# Patient Record
Sex: MALE | Race: BLACK OR AFRICAN AMERICAN | NOT HISPANIC OR LATINO
[De-identification: names, ages, dates, MRNs, and addresses within clinical notes are randomized per-mention and may not be internally consistent; named-entity substitution may affect disease eponyms.]

---

## 2019-03-17 ENCOUNTER — TRANSCRIPTION ENCOUNTER (OUTPATIENT)
Age: 59
End: 2019-03-17

## 2020-03-31 ENCOUNTER — TRANSCRIPTION ENCOUNTER (OUTPATIENT)
Age: 60
End: 2020-03-31

## 2020-04-08 ENCOUNTER — TRANSCRIPTION ENCOUNTER (OUTPATIENT)
Age: 60
End: 2020-04-08

## 2020-07-31 ENCOUNTER — TRANSCRIPTION ENCOUNTER (OUTPATIENT)
Age: 60
End: 2020-07-31

## 2020-08-04 PROBLEM — Z00.00 ENCOUNTER FOR PREVENTIVE HEALTH EXAMINATION: Status: ACTIVE | Noted: 2020-08-04

## 2020-08-05 ENCOUNTER — APPOINTMENT (OUTPATIENT)
Dept: ORTHOPEDIC SURGERY | Facility: CLINIC | Age: 60
End: 2020-08-05
Payer: MEDICAID

## 2020-08-05 VITALS
WEIGHT: 204 LBS | DIASTOLIC BLOOD PRESSURE: 89 MMHG | HEIGHT: 69.5 IN | HEART RATE: 74 BPM | SYSTOLIC BLOOD PRESSURE: 134 MMHG | BODY MASS INDEX: 29.53 KG/M2

## 2020-08-05 VITALS — TEMPERATURE: 97.8 F

## 2020-08-05 DIAGNOSIS — M43.16 SPONDYLOLISTHESIS, LUMBAR REGION: ICD-10-CM

## 2020-08-05 PROCEDURE — 99204 OFFICE O/P NEW MOD 45 MIN: CPT

## 2020-08-05 PROCEDURE — 72100 X-RAY EXAM L-S SPINE 2/3 VWS: CPT

## 2020-11-05 ENCOUNTER — RX RENEWAL (OUTPATIENT)
Age: 60
End: 2020-11-05

## 2022-08-23 ENCOUNTER — NON-APPOINTMENT (OUTPATIENT)
Age: 62
End: 2022-08-23

## 2022-08-31 ENCOUNTER — NON-APPOINTMENT (OUTPATIENT)
Age: 62
End: 2022-08-31

## 2022-08-31 ENCOUNTER — APPOINTMENT (OUTPATIENT)
Dept: FAMILY MEDICINE | Facility: CLINIC | Age: 62
End: 2022-08-31

## 2022-08-31 VITALS
TEMPERATURE: 98.1 F | HEIGHT: 68.5 IN | SYSTOLIC BLOOD PRESSURE: 140 MMHG | RESPIRATION RATE: 18 BRPM | OXYGEN SATURATION: 99 % | HEART RATE: 80 BPM | DIASTOLIC BLOOD PRESSURE: 90 MMHG | BODY MASS INDEX: 29.66 KG/M2 | WEIGHT: 198 LBS

## 2022-08-31 DIAGNOSIS — Z80.0 FAMILY HISTORY OF MALIGNANT NEOPLASM OF DIGESTIVE ORGANS: ICD-10-CM

## 2022-08-31 DIAGNOSIS — Z91.89 OTHER SPECIFIED PERSONAL RISK FACTORS, NOT ELSEWHERE CLASSIFIED: ICD-10-CM

## 2022-08-31 DIAGNOSIS — M54.16 RADICULOPATHY, LUMBAR REGION: ICD-10-CM

## 2022-08-31 DIAGNOSIS — R81 GLYCOSURIA: ICD-10-CM

## 2022-08-31 DIAGNOSIS — Z82.49 FAMILY HISTORY OF ISCHEMIC HEART DISEASE AND OTHER DISEASES OF THE CIRCULATORY SYSTEM: ICD-10-CM

## 2022-08-31 DIAGNOSIS — Z83.3 FAMILY HISTORY OF DIABETES MELLITUS: ICD-10-CM

## 2022-08-31 DIAGNOSIS — Z82.69 FAMILY HISTORY OF OTHER DISEASES OF THE MUSCULOSKELETAL SYSTEM AND CONNECTIVE TISSUE: ICD-10-CM

## 2022-08-31 DIAGNOSIS — R73.9 HYPERGLYCEMIA, UNSPECIFIED: ICD-10-CM

## 2022-08-31 DIAGNOSIS — R79.89 OTHER SPECIFIED ABNORMAL FINDINGS OF BLOOD CHEMISTRY: ICD-10-CM

## 2022-08-31 DIAGNOSIS — Z78.9 OTHER SPECIFIED HEALTH STATUS: ICD-10-CM

## 2022-08-31 PROCEDURE — 93000 ELECTROCARDIOGRAM COMPLETE: CPT | Mod: 59

## 2022-08-31 PROCEDURE — 36415 COLL VENOUS BLD VENIPUNCTURE: CPT

## 2022-08-31 PROCEDURE — G0444 DEPRESSION SCREEN ANNUAL: CPT

## 2022-08-31 PROCEDURE — 99386 PREV VISIT NEW AGE 40-64: CPT | Mod: 25

## 2022-08-31 PROCEDURE — 99203 OFFICE O/P NEW LOW 30 MIN: CPT | Mod: 25

## 2022-08-31 RX ORDER — TESTOSTERONE 10 MG/.5G
10 MG/ACT GEL, METERED TOPICAL
Refills: 0 | Status: ACTIVE | COMMUNITY

## 2022-08-31 RX ORDER — METHYLPREDNISOLONE 4 MG/1
4 TABLET ORAL
Refills: 0 | Status: COMPLETED | COMMUNITY

## 2022-09-01 ENCOUNTER — NON-APPOINTMENT (OUTPATIENT)
Age: 62
End: 2022-09-01

## 2022-09-01 PROBLEM — Z82.49 FAMILY HISTORY OF HYPERTENSION: Status: ACTIVE | Noted: 2022-08-31

## 2022-09-01 PROBLEM — Z82.69 FAMILY HISTORY OF SCLERODERMA: Status: ACTIVE | Noted: 2022-08-31

## 2022-09-01 PROBLEM — M54.16 LUMBAR RADICULOPATHY: Status: ACTIVE | Noted: 2020-08-05

## 2022-09-01 PROBLEM — Z80.0 FAMILY HISTORY OF PANCREATIC CANCER: Status: ACTIVE | Noted: 2022-08-31

## 2022-09-01 PROBLEM — Z91.89 AT INCREASED RISK FOR CARDIOVASCULAR DISEASE: Status: ACTIVE | Noted: 2022-09-01

## 2022-09-01 PROBLEM — R79.89 LOW TESTOSTERONE: Status: RESOLVED | Noted: 2022-08-31 | Resolved: 2022-09-01

## 2022-09-01 LAB
ALBUMIN SERPL ELPH-MCNC: 4.7 G/DL
ALP BLD-CCNC: 51 U/L
ALT SERPL-CCNC: 49 U/L
ANION GAP SERPL CALC-SCNC: 17 MMOL/L
APPEARANCE: CLEAR
AST SERPL-CCNC: 32 U/L
BACTERIA: NEGATIVE
BASOPHILS # BLD AUTO: 0.03 K/UL
BASOPHILS NFR BLD AUTO: 0.5 %
BILIRUB SERPL-MCNC: 0.5 MG/DL
BILIRUBIN URINE: NEGATIVE
BLOOD URINE: NEGATIVE
BUN SERPL-MCNC: 15 MG/DL
CALCIUM SERPL-MCNC: 9.5 MG/DL
CHLORIDE SERPL-SCNC: 101 MMOL/L
CHOLEST SERPL-MCNC: 193 MG/DL
CO2 SERPL-SCNC: 18 MMOL/L
COLOR: YELLOW
CREAT SERPL-MCNC: 1.07 MG/DL
EGFR: 79 ML/MIN/1.73M2
EOSINOPHIL # BLD AUTO: 0.16 K/UL
EOSINOPHIL NFR BLD AUTO: 2.6 %
ESTIMATED AVERAGE GLUCOSE: 249 MG/DL
GLUCOSE QUALITATIVE U: ABNORMAL
GLUCOSE SERPL-MCNC: 294 MG/DL
HBA1C MFR BLD HPLC: 10.3 %
HCT VFR BLD CALC: 51.2 %
HDLC SERPL-MCNC: 50 MG/DL
HGB BLD-MCNC: 17.2 G/DL
HYALINE CASTS: 1 /LPF
IMM GRANULOCYTES NFR BLD AUTO: 0.2 %
KETONES URINE: NORMAL
LDLC SERPL CALC-MCNC: 79 MG/DL
LEUKOCYTE ESTERASE URINE: NEGATIVE
LYMPHOCYTES # BLD AUTO: 1.95 K/UL
LYMPHOCYTES NFR BLD AUTO: 31.7 %
MAN DIFF?: NORMAL
MCHC RBC-ENTMCNC: 29.7 PG
MCHC RBC-ENTMCNC: 33.6 GM/DL
MCV RBC AUTO: 88.3 FL
MICROSCOPIC-UA: NORMAL
MONOCYTES # BLD AUTO: 0.61 K/UL
MONOCYTES NFR BLD AUTO: 9.9 %
NEUTROPHILS # BLD AUTO: 3.4 K/UL
NEUTROPHILS NFR BLD AUTO: 55.1 %
NITRITE URINE: NEGATIVE
NONHDLC SERPL-MCNC: 143 MG/DL
PH URINE: 6
PLATELET # BLD AUTO: 226 K/UL
POTASSIUM SERPL-SCNC: 4.2 MMOL/L
PROT SERPL-MCNC: 7.8 G/DL
PROTEIN URINE: NEGATIVE
RBC # BLD: 5.8 M/UL
RBC # FLD: 13.3 %
RED BLOOD CELLS URINE: 2 /HPF
SODIUM SERPL-SCNC: 136 MMOL/L
SPECIFIC GRAVITY URINE: 1.02
SQUAMOUS EPITHELIAL CELLS: 0 /HPF
TRIGL SERPL-MCNC: 322 MG/DL
TSH SERPL-ACNC: 1.59 UIU/ML
UROBILINOGEN URINE: NORMAL
WBC # FLD AUTO: 6.16 K/UL
WHITE BLOOD CELLS URINE: 0 /HPF

## 2022-09-01 RX ORDER — CYCLOBENZAPRINE HYDROCHLORIDE 10 MG/1
10 TABLET, FILM COATED ORAL
Refills: 0 | Status: COMPLETED | COMMUNITY
End: 2022-09-01

## 2022-11-30 ENCOUNTER — RX RENEWAL (OUTPATIENT)
Age: 62
End: 2022-11-30

## 2022-12-08 ENCOUNTER — RX RENEWAL (OUTPATIENT)
Age: 62
End: 2022-12-08

## 2022-12-13 ENCOUNTER — APPOINTMENT (OUTPATIENT)
Dept: FAMILY MEDICINE | Facility: CLINIC | Age: 62
End: 2022-12-13

## 2022-12-13 VITALS
DIASTOLIC BLOOD PRESSURE: 86 MMHG | TEMPERATURE: 98.8 F | BODY MASS INDEX: 31.91 KG/M2 | RESPIRATION RATE: 16 BRPM | SYSTOLIC BLOOD PRESSURE: 132 MMHG | OXYGEN SATURATION: 97 % | WEIGHT: 213 LBS | HEART RATE: 102 BPM

## 2022-12-13 DIAGNOSIS — Z00.00 ENCOUNTER FOR GENERAL ADULT MEDICAL EXAMINATION W/OUT ABNORMAL FINDINGS: ICD-10-CM

## 2022-12-13 PROCEDURE — G0009: CPT

## 2022-12-13 PROCEDURE — 99214 OFFICE O/P EST MOD 30 MIN: CPT | Mod: 25

## 2022-12-13 PROCEDURE — 90677 PCV20 VACCINE IM: CPT

## 2022-12-13 NOTE — HISTORY OF PRESENT ILLNESS
[FreeTextEntry1] : Follow up for Diabetes and hypertriglyceridemia management [de-identified] : Pt reports doing well, has no acute issues to offer on this visit. Reports adherence to metformin 1000mg BID, glipizide 5mg in the AM, and daily icosapent ethyl, without side effects. He reports decreased nocturia, also has not been drinking too much water prior to bed. Reports eating healthy, reduced alcohol intake.\par Denies any visual disturbances, denies tingling/numbness sensation in extremities.\par \par Pt has completed 8 sessions of physical therapy for known lumbar radiculopathy, and reports overall with improved pain, occasionally gets pain and takes his tizanidine which helps.\par \par He recall getting a pneumococcal vaccination in 2020 following a pneumonia infection. Pt does not recall the type of pneumococcal vaccine received. \par He received flu vaccination and Pfizer COVID booster vaccines this past October at his pharmacy.

## 2022-12-13 NOTE — PHYSICAL EXAM
[Normal] : normal gait, coordination grossly intact, no focal deficits and deep tendon reflexes were 2+ and symmetric [Speech Grossly Normal] : speech grossly normal [Memory Grossly Normal] : memory grossly normal [Normal Affect] : the affect was normal [Alert and Oriented x3] : oriented to person, place, and time [Normal Mood] : the mood was normal [Normal Insight/Judgement] : insight and judgment were intact [None] : no ulcers in either foot were found [] : both feet [de-identified] : onychomycosis of R great toenail; + interdigit scaling lesions especially of R foot [TWNoteComboBox3] : +2 [TWNoteComboBox4] : +2

## 2022-12-13 NOTE — REVIEW OF SYSTEMS
[Negative] : Psychiatric [Joint Pain] : no joint pain [Joint Stiffness] : no joint stiffness [Muscle Pain] : no muscle pain [Muscle Weakness] : no muscle weakness

## 2022-12-16 ENCOUNTER — NON-APPOINTMENT (OUTPATIENT)
Age: 62
End: 2022-12-16

## 2022-12-16 LAB
ANION GAP SERPL CALC-SCNC: 14 MMOL/L
BUN SERPL-MCNC: 15 MG/DL
CALCIUM SERPL-MCNC: 9.7 MG/DL
CHLORIDE SERPL-SCNC: 103 MMOL/L
CHOLEST SERPL-MCNC: 184 MG/DL
CO2 SERPL-SCNC: 23 MMOL/L
CREAT SERPL-MCNC: 1.16 MG/DL
CREAT SPEC-SCNC: 87 MG/DL
EGFR: 71 ML/MIN/1.73M2
ESTIMATED AVERAGE GLUCOSE: 146 MG/DL
GLUCOSE SERPL-MCNC: 141 MG/DL
HBA1C MFR BLD HPLC: 6.7 %
HDLC SERPL-MCNC: 61 MG/DL
LDLC SERPL CALC-MCNC: 97 MG/DL
MICROALBUMIN 24H UR DL<=1MG/L-MCNC: <1.2 MG/DL
MICROALBUMIN/CREAT 24H UR-RTO: NORMAL MG/G
NONHDLC SERPL-MCNC: 123 MG/DL
POTASSIUM SERPL-SCNC: 4.3 MMOL/L
SODIUM SERPL-SCNC: 140 MMOL/L
TRIGL SERPL-MCNC: 133 MG/DL
VIT B12 SERPL-MCNC: 718 PG/ML

## 2023-01-04 ENCOUNTER — RX RENEWAL (OUTPATIENT)
Age: 63
End: 2023-01-04

## 2023-02-02 ENCOUNTER — RX RENEWAL (OUTPATIENT)
Age: 63
End: 2023-02-02

## 2023-03-16 ENCOUNTER — APPOINTMENT (OUTPATIENT)
Dept: FAMILY MEDICINE | Facility: CLINIC | Age: 63
End: 2023-03-16

## 2023-03-27 ENCOUNTER — APPOINTMENT (OUTPATIENT)
Dept: FAMILY MEDICINE | Facility: CLINIC | Age: 63
End: 2023-03-27

## 2023-04-04 ENCOUNTER — RX RENEWAL (OUTPATIENT)
Age: 63
End: 2023-04-04

## 2023-06-14 ENCOUNTER — RX RENEWAL (OUTPATIENT)
Age: 63
End: 2023-06-14

## 2023-06-26 ENCOUNTER — RX RENEWAL (OUTPATIENT)
Age: 63
End: 2023-06-26

## 2023-07-06 ENCOUNTER — APPOINTMENT (OUTPATIENT)
Dept: FAMILY MEDICINE | Facility: CLINIC | Age: 63
End: 2023-07-06
Payer: MEDICAID

## 2023-07-06 VITALS
OXYGEN SATURATION: 97 % | RESPIRATION RATE: 16 BRPM | SYSTOLIC BLOOD PRESSURE: 150 MMHG | DIASTOLIC BLOOD PRESSURE: 98 MMHG | HEIGHT: 68.5 IN | HEART RATE: 92 BPM | BODY MASS INDEX: 32.96 KG/M2 | WEIGHT: 220 LBS

## 2023-07-06 DIAGNOSIS — B35.1 TINEA UNGUIUM: ICD-10-CM

## 2023-07-06 DIAGNOSIS — B35.3 TINEA PEDIS: ICD-10-CM

## 2023-07-06 PROCEDURE — 99214 OFFICE O/P EST MOD 30 MIN: CPT | Mod: 25

## 2023-07-06 RX ORDER — TIZANIDINE 4 MG/1
4 TABLET ORAL
Qty: 30 | Refills: 1 | Status: DISCONTINUED | COMMUNITY
Start: 2020-08-05 | End: 2023-07-06

## 2023-07-06 RX ORDER — CICLOPIROX 80 MG/ML
8 SOLUTION TOPICAL
Qty: 2 | Refills: 3 | Status: DISCONTINUED | COMMUNITY
Start: 2022-12-13 | End: 2023-07-06

## 2023-07-06 RX ORDER — TERBINAFINE HYDROCHLORIDE 1 G/100G
1 CREAM TOPICAL
Qty: 1 | Refills: 1 | Status: DISCONTINUED | COMMUNITY
Start: 2022-12-13 | End: 2023-07-06

## 2023-07-06 NOTE — REVIEW OF SYSTEMS
[Fatigue] : fatigue [Recent Change In Weight] : ~T recent weight change [Negative] : Heme/Lymph [Fever] : no fever [Chills] : no chills [Night Sweats] : no night sweats [FreeTextEntry2] : reports weight gain

## 2023-07-06 NOTE — PHYSICAL EXAM
[Normal Oropharynx] : the oropharynx was normal [No Carotid Bruits] : no carotid bruits [No Varicosities] : no varicosities [Pedal Pulses Present] : the pedal pulses are present [No Edema] : there was no peripheral edema [No Extremity Clubbing/Cyanosis] : no extremity clubbing/cyanosis [Soft] : abdomen soft [Non Tender] : non-tender [Normal] : no joint swelling and grossly normal strength and tone [Coordination Grossly Intact] : coordination grossly intact [No Focal Deficits] : no focal deficits [Normal Gait] : normal gait [Speech Grossly Normal] : speech grossly normal [Memory Grossly Normal] : memory grossly normal [Normal Affect] : the affect was normal [Alert and Oriented x3] : oriented to person, place, and time [Normal Mood] : the mood was normal [Normal Insight/Judgement] : insight and judgment were intact [None] : no ulcers in either foot were found [] : both feet [de-identified] : wet, scaly lesions of the interweb of the toes [de-identified] : Onychomycosis involving most surface of the R great toenail with yellowish discoloration; mild onychomycosis of the L great toenail [TWNoteComboBox3] : +2 [TWNoteComboBox4] : +2

## 2023-07-06 NOTE — HISTORY OF PRESENT ILLNESS
[FreeTextEntry1] : follow up on chronic disease management [de-identified] : Pt is here for follow up on TDM and HLD management.\par Pt reports weight gain since last seen in the office and frequent feeling of fatigue despite continuing to remain physically active, working out 3 days a week, running for 3.5 miles often, and eating healthy. Pt is concerned that his current DM medications might be contributing.\par He denies any hypoglycemic episodes.\par \par Pt also reports he was unable to  previously prescribed medication treatment for onychomycosis of the toenails due to insurance not covering. The toenail fungal infection has persisted, said to appear worse from before. Pt has appointment to see his Podiatrist in the next few weeks.\par \par Pt's blood pressure also noted to be elevated today. He reports occasionally checking at home with measures of 140s, low 150s systolic "here and there" as well. Pt denies headaches, visual sxs, swelling in the extremities or chest pains. He saw his Ophthalmologist 3 weeks ago, with normal eye exam, as per pt; his prescription lenses strength were increased.

## 2023-07-11 ENCOUNTER — NON-APPOINTMENT (OUTPATIENT)
Age: 63
End: 2023-07-11

## 2023-07-11 LAB
ALBUMIN SERPL ELPH-MCNC: 4.9 G/DL
ALP BLD-CCNC: 55 U/L
ALT SERPL-CCNC: 30 U/L
ANION GAP SERPL CALC-SCNC: 16 MMOL/L
AST SERPL-CCNC: 27 U/L
BILIRUB SERPL-MCNC: 0.5 MG/DL
BUN SERPL-MCNC: 14 MG/DL
CALCIUM SERPL-MCNC: 9.7 MG/DL
CHLORIDE SERPL-SCNC: 104 MMOL/L
CHOLEST SERPL-MCNC: 215 MG/DL
CO2 SERPL-SCNC: 21 MMOL/L
CREAT SERPL-MCNC: 1.14 MG/DL
EGFR: 73 ML/MIN/1.73M2
ESTIMATED AVERAGE GLUCOSE: 174 MG/DL
GLUCOSE SERPL-MCNC: 240 MG/DL
HBA1C MFR BLD HPLC: 7.7 %
HDLC SERPL-MCNC: 51 MG/DL
LDLC SERPL CALC-MCNC: 109 MG/DL
NONHDLC SERPL-MCNC: 163 MG/DL
POTASSIUM SERPL-SCNC: 4.2 MMOL/L
PROT SERPL-MCNC: 7.9 G/DL
SODIUM SERPL-SCNC: 140 MMOL/L
TESTOST SERPL-MCNC: 560 NG/DL
TRIGL SERPL-MCNC: 269 MG/DL

## 2023-07-11 RX ORDER — GLIPIZIDE 5 MG/1
5 TABLET, FILM COATED, EXTENDED RELEASE ORAL DAILY
Qty: 30 | Refills: 0 | Status: DISCONTINUED | COMMUNITY
Start: 2022-09-01 | End: 2023-07-11

## 2023-07-21 ENCOUNTER — APPOINTMENT (OUTPATIENT)
Dept: FAMILY MEDICINE | Facility: CLINIC | Age: 63
End: 2023-07-21
Payer: MEDICAID

## 2023-07-21 VITALS — SYSTOLIC BLOOD PRESSURE: 135 MMHG | DIASTOLIC BLOOD PRESSURE: 92 MMHG

## 2023-07-21 VITALS
BODY MASS INDEX: 33.34 KG/M2 | TEMPERATURE: 94.8 F | HEART RATE: 95 BPM | SYSTOLIC BLOOD PRESSURE: 144 MMHG | WEIGHT: 220 LBS | RESPIRATION RATE: 17 BRPM | HEIGHT: 68 IN | OXYGEN SATURATION: 96 % | DIASTOLIC BLOOD PRESSURE: 86 MMHG

## 2023-07-21 DIAGNOSIS — R03.0 ELEVATED BLOOD-PRESSURE READING, W/OUT DIAGNOSIS OF HYPERTENSION: ICD-10-CM

## 2023-07-21 PROCEDURE — 99213 OFFICE O/P EST LOW 20 MIN: CPT

## 2023-07-26 ENCOUNTER — RX RENEWAL (OUTPATIENT)
Age: 63
End: 2023-07-26

## 2023-07-26 RX ORDER — ICOSAPENT ETHYL 1 G/1
1 CAPSULE ORAL
Qty: 120 | Refills: 3 | Status: ACTIVE | COMMUNITY
Start: 2022-09-01 | End: 1900-01-01

## 2023-08-02 ENCOUNTER — RX RENEWAL (OUTPATIENT)
Age: 63
End: 2023-08-02

## 2023-08-04 ENCOUNTER — APPOINTMENT (OUTPATIENT)
Dept: CARDIOLOGY | Facility: CLINIC | Age: 63
End: 2023-08-04

## 2023-08-04 ENCOUNTER — TRANSCRIPTION ENCOUNTER (OUTPATIENT)
Age: 63
End: 2023-08-04

## 2023-08-24 ENCOUNTER — APPOINTMENT (OUTPATIENT)
Dept: INTERNAL MEDICINE | Facility: CLINIC | Age: 63
End: 2023-08-24

## 2023-08-24 ENCOUNTER — APPOINTMENT (OUTPATIENT)
Dept: INTERNAL MEDICINE | Facility: CLINIC | Age: 63
End: 2023-08-24
Payer: MEDICAID

## 2023-08-24 VITALS
RESPIRATION RATE: 17 BRPM | DIASTOLIC BLOOD PRESSURE: 78 MMHG | OXYGEN SATURATION: 97 % | SYSTOLIC BLOOD PRESSURE: 124 MMHG | HEART RATE: 85 BPM

## 2023-08-24 VITALS
OXYGEN SATURATION: 97 % | DIASTOLIC BLOOD PRESSURE: 78 MMHG | HEART RATE: 87 BPM | SYSTOLIC BLOOD PRESSURE: 122 MMHG | RESPIRATION RATE: 17 BRPM

## 2023-08-24 VITALS
SYSTOLIC BLOOD PRESSURE: 126 MMHG | OXYGEN SATURATION: 97 % | DIASTOLIC BLOOD PRESSURE: 82 MMHG | RESPIRATION RATE: 17 BRPM | HEART RATE: 90 BPM

## 2023-08-24 VITALS
RESPIRATION RATE: 17 BRPM | OXYGEN SATURATION: 97 % | DIASTOLIC BLOOD PRESSURE: 76 MMHG | SYSTOLIC BLOOD PRESSURE: 122 MMHG | HEART RATE: 87 BPM

## 2023-08-24 PROCEDURE — 99211 OFF/OP EST MAY X REQ PHY/QHP: CPT

## 2023-10-04 RX ORDER — TERBINAFINE HYDROCHLORIDE 250 MG/1
250 TABLET ORAL
Qty: 30 | Refills: 2 | Status: COMPLETED | COMMUNITY
Start: 2023-07-06 | End: 2023-10-04

## 2023-10-26 ENCOUNTER — APPOINTMENT (OUTPATIENT)
Dept: INTERNAL MEDICINE | Facility: CLINIC | Age: 63
End: 2023-10-26
Payer: MEDICAID

## 2023-10-26 VITALS
TEMPERATURE: 98.5 F | BODY MASS INDEX: 30.11 KG/M2 | HEART RATE: 98 BPM | OXYGEN SATURATION: 98 % | SYSTOLIC BLOOD PRESSURE: 130 MMHG | DIASTOLIC BLOOD PRESSURE: 90 MMHG | RESPIRATION RATE: 16 BRPM | WEIGHT: 198 LBS

## 2023-10-26 DIAGNOSIS — R19.7 DIARRHEA, UNSPECIFIED: ICD-10-CM

## 2023-10-26 DIAGNOSIS — E11.65 TYPE 2 DIABETES MELLITUS WITH HYPERGLYCEMIA: ICD-10-CM

## 2023-10-26 LAB
GLUCOSE BLDC GLUCOMTR-MCNC: 137
HBA1C MFR BLD HPLC: 7.2

## 2023-10-26 PROCEDURE — 83036 HEMOGLOBIN GLYCOSYLATED A1C: CPT | Mod: QW

## 2023-10-26 PROCEDURE — 82962 GLUCOSE BLOOD TEST: CPT

## 2023-10-26 PROCEDURE — 99214 OFFICE O/P EST MOD 30 MIN: CPT | Mod: 25

## 2023-10-29 ENCOUNTER — INPATIENT (INPATIENT)
Facility: HOSPITAL | Age: 63
LOS: 1 days | Discharge: HOME | End: 2023-10-31
Attending: GENERAL ACUTE CARE HOSPITAL | Admitting: GENERAL ACUTE CARE HOSPITAL
Payer: MEDICAID

## 2023-10-29 VITALS
TEMPERATURE: 98 F | HEART RATE: 139 BPM | SYSTOLIC BLOOD PRESSURE: 126 MMHG | WEIGHT: 197.98 LBS | OXYGEN SATURATION: 98 % | HEIGHT: 69 IN | DIASTOLIC BLOOD PRESSURE: 84 MMHG | RESPIRATION RATE: 18 BRPM

## 2023-10-29 LAB
ANION GAP SERPL CALC-SCNC: 8 MMOL/L — SIGNIFICANT CHANGE UP (ref 5–17)
ANION GAP SERPL CALC-SCNC: 8 MMOL/L — SIGNIFICANT CHANGE UP (ref 5–17)
BLD GP AB SCN SERPL QL: SIGNIFICANT CHANGE UP
BLD GP AB SCN SERPL QL: SIGNIFICANT CHANGE UP
BUN SERPL-MCNC: 19 MG/DL — SIGNIFICANT CHANGE UP (ref 7–23)
BUN SERPL-MCNC: 19 MG/DL — SIGNIFICANT CHANGE UP (ref 7–23)
CALCIUM SERPL-MCNC: 8.6 MG/DL — SIGNIFICANT CHANGE UP (ref 8.5–10.1)
CALCIUM SERPL-MCNC: 8.6 MG/DL — SIGNIFICANT CHANGE UP (ref 8.5–10.1)
CHLORIDE SERPL-SCNC: 109 MMOL/L — HIGH (ref 96–108)
CHLORIDE SERPL-SCNC: 109 MMOL/L — HIGH (ref 96–108)
CO2 SERPL-SCNC: 22 MMOL/L — SIGNIFICANT CHANGE UP (ref 22–31)
CO2 SERPL-SCNC: 22 MMOL/L — SIGNIFICANT CHANGE UP (ref 22–31)
CREAT SERPL-MCNC: 1.28 MG/DL — SIGNIFICANT CHANGE UP (ref 0.5–1.3)
CREAT SERPL-MCNC: 1.28 MG/DL — SIGNIFICANT CHANGE UP (ref 0.5–1.3)
EGFR: 63 ML/MIN/1.73M2 — SIGNIFICANT CHANGE UP
EGFR: 63 ML/MIN/1.73M2 — SIGNIFICANT CHANGE UP
GLUCOSE BLDC GLUCOMTR-MCNC: 119 MG/DL — HIGH (ref 70–99)
GLUCOSE BLDC GLUCOMTR-MCNC: 119 MG/DL — HIGH (ref 70–99)
GLUCOSE SERPL-MCNC: 178 MG/DL — HIGH (ref 70–99)
GLUCOSE SERPL-MCNC: 178 MG/DL — HIGH (ref 70–99)
HCT VFR BLD CALC: 29.1 % — LOW (ref 39–50)
HCT VFR BLD CALC: 29.1 % — LOW (ref 39–50)
HCT VFR BLD CALC: 39.5 % — SIGNIFICANT CHANGE UP (ref 39–50)
HCT VFR BLD CALC: 39.5 % — SIGNIFICANT CHANGE UP (ref 39–50)
HGB BLD-MCNC: 13.6 G/DL — SIGNIFICANT CHANGE UP (ref 13–17)
HGB BLD-MCNC: 13.6 G/DL — SIGNIFICANT CHANGE UP (ref 13–17)
HGB BLD-MCNC: 9.9 G/DL — LOW (ref 13–17)
HGB BLD-MCNC: 9.9 G/DL — LOW (ref 13–17)
INR BLD: 0.97 RATIO — SIGNIFICANT CHANGE UP (ref 0.85–1.18)
INR BLD: 0.97 RATIO — SIGNIFICANT CHANGE UP (ref 0.85–1.18)
MCHC RBC-ENTMCNC: 28.4 PG — SIGNIFICANT CHANGE UP (ref 27–34)
MCHC RBC-ENTMCNC: 28.4 PG — SIGNIFICANT CHANGE UP (ref 27–34)
MCHC RBC-ENTMCNC: 28.6 PG — SIGNIFICANT CHANGE UP (ref 27–34)
MCHC RBC-ENTMCNC: 28.6 PG — SIGNIFICANT CHANGE UP (ref 27–34)
MCHC RBC-ENTMCNC: 34 G/DL — SIGNIFICANT CHANGE UP (ref 32–36)
MCHC RBC-ENTMCNC: 34 G/DL — SIGNIFICANT CHANGE UP (ref 32–36)
MCHC RBC-ENTMCNC: 34.4 G/DL — SIGNIFICANT CHANGE UP (ref 32–36)
MCHC RBC-ENTMCNC: 34.4 G/DL — SIGNIFICANT CHANGE UP (ref 32–36)
MCV RBC AUTO: 83 FL — SIGNIFICANT CHANGE UP (ref 80–100)
MCV RBC AUTO: 83 FL — SIGNIFICANT CHANGE UP (ref 80–100)
MCV RBC AUTO: 83.6 FL — SIGNIFICANT CHANGE UP (ref 80–100)
MCV RBC AUTO: 83.6 FL — SIGNIFICANT CHANGE UP (ref 80–100)
NRBC # BLD: 0 /100 WBCS — SIGNIFICANT CHANGE UP (ref 0–0)
PLATELET # BLD AUTO: 221 K/UL — SIGNIFICANT CHANGE UP (ref 150–400)
PLATELET # BLD AUTO: 221 K/UL — SIGNIFICANT CHANGE UP (ref 150–400)
PLATELET # BLD AUTO: 290 K/UL — SIGNIFICANT CHANGE UP (ref 150–400)
PLATELET # BLD AUTO: 290 K/UL — SIGNIFICANT CHANGE UP (ref 150–400)
POTASSIUM SERPL-MCNC: 4.2 MMOL/L — SIGNIFICANT CHANGE UP (ref 3.5–5.3)
POTASSIUM SERPL-MCNC: 4.2 MMOL/L — SIGNIFICANT CHANGE UP (ref 3.5–5.3)
POTASSIUM SERPL-SCNC: 4.2 MMOL/L — SIGNIFICANT CHANGE UP (ref 3.5–5.3)
POTASSIUM SERPL-SCNC: 4.2 MMOL/L — SIGNIFICANT CHANGE UP (ref 3.5–5.3)
PROTHROM AB SERPL-ACNC: 11.7 SEC — SIGNIFICANT CHANGE UP (ref 9.5–13)
PROTHROM AB SERPL-ACNC: 11.7 SEC — SIGNIFICANT CHANGE UP (ref 9.5–13)
RBC # BLD: 3.48 M/UL — LOW (ref 4.2–5.8)
RBC # BLD: 3.48 M/UL — LOW (ref 4.2–5.8)
RBC # BLD: 4.76 M/UL — SIGNIFICANT CHANGE UP (ref 4.2–5.8)
RBC # BLD: 4.76 M/UL — SIGNIFICANT CHANGE UP (ref 4.2–5.8)
RBC # FLD: 14 % — SIGNIFICANT CHANGE UP (ref 10.3–14.5)
RBC # FLD: 14 % — SIGNIFICANT CHANGE UP (ref 10.3–14.5)
RBC # FLD: 14.1 % — SIGNIFICANT CHANGE UP (ref 10.3–14.5)
RBC # FLD: 14.1 % — SIGNIFICANT CHANGE UP (ref 10.3–14.5)
SODIUM SERPL-SCNC: 139 MMOL/L — SIGNIFICANT CHANGE UP (ref 135–145)
SODIUM SERPL-SCNC: 139 MMOL/L — SIGNIFICANT CHANGE UP (ref 135–145)
WBC # BLD: 6.7 K/UL — SIGNIFICANT CHANGE UP (ref 3.8–10.5)
WBC # BLD: 6.7 K/UL — SIGNIFICANT CHANGE UP (ref 3.8–10.5)
WBC # BLD: 8.02 K/UL — SIGNIFICANT CHANGE UP (ref 3.8–10.5)
WBC # BLD: 8.02 K/UL — SIGNIFICANT CHANGE UP (ref 3.8–10.5)
WBC # FLD AUTO: 6.7 K/UL — SIGNIFICANT CHANGE UP (ref 3.8–10.5)
WBC # FLD AUTO: 6.7 K/UL — SIGNIFICANT CHANGE UP (ref 3.8–10.5)
WBC # FLD AUTO: 8.02 K/UL — SIGNIFICANT CHANGE UP (ref 3.8–10.5)
WBC # FLD AUTO: 8.02 K/UL — SIGNIFICANT CHANGE UP (ref 3.8–10.5)

## 2023-10-29 PROCEDURE — 99223 1ST HOSP IP/OBS HIGH 75: CPT

## 2023-10-29 PROCEDURE — 93010 ELECTROCARDIOGRAM REPORT: CPT | Mod: 76

## 2023-10-29 PROCEDURE — G1004: CPT

## 2023-10-29 PROCEDURE — 99285 EMERGENCY DEPT VISIT HI MDM: CPT | Mod: 25

## 2023-10-29 PROCEDURE — 74176 CT ABD & PELVIS W/O CONTRAST: CPT | Mod: 26,59

## 2023-10-29 PROCEDURE — 74177 CT ABD & PELVIS W/CONTRAST: CPT | Mod: 26,ME

## 2023-10-29 RX ORDER — SODIUM CHLORIDE 9 MG/ML
1000 INJECTION, SOLUTION INTRAVENOUS ONCE
Refills: 0 | Status: COMPLETED | OUTPATIENT
Start: 2023-10-29 | End: 2023-10-29

## 2023-10-29 RX ORDER — PANTOPRAZOLE SODIUM 20 MG/1
8 TABLET, DELAYED RELEASE ORAL
Qty: 80 | Refills: 0 | Status: DISCONTINUED | OUTPATIENT
Start: 2023-10-29 | End: 2023-10-30

## 2023-10-29 RX ORDER — IOHEXOL 300 MG/ML
30 INJECTION, SOLUTION INTRAVENOUS ONCE
Refills: 0 | Status: COMPLETED | OUTPATIENT
Start: 2023-10-29 | End: 2023-10-29

## 2023-10-29 RX ORDER — SODIUM CHLORIDE 9 MG/ML
1000 INJECTION INTRAMUSCULAR; INTRAVENOUS; SUBCUTANEOUS
Refills: 0 | Status: DISCONTINUED | OUTPATIENT
Start: 2023-10-29 | End: 2023-10-30

## 2023-10-29 RX ADMIN — SODIUM CHLORIDE 1000 MILLILITER(S): 9 INJECTION, SOLUTION INTRAVENOUS at 08:18

## 2023-10-29 RX ADMIN — IOHEXOL 30 MILLILITER(S): 300 INJECTION, SOLUTION INTRAVENOUS at 17:45

## 2023-10-29 RX ADMIN — PANTOPRAZOLE SODIUM 10 MG/HR: 20 TABLET, DELAYED RELEASE ORAL at 08:19

## 2023-10-29 RX ADMIN — SODIUM CHLORIDE 1000 MILLILITER(S): 9 INJECTION, SOLUTION INTRAVENOUS at 07:43

## 2023-10-29 RX ADMIN — PANTOPRAZOLE SODIUM 10 MG/HR: 20 TABLET, DELAYED RELEASE ORAL at 17:47

## 2023-10-29 RX ADMIN — SODIUM CHLORIDE 125 MILLILITER(S): 9 INJECTION INTRAMUSCULAR; INTRAVENOUS; SUBCUTANEOUS at 15:22

## 2023-10-29 NOTE — ED PROVIDER NOTE - CHIEF COMPLAINT
The patient is a 63y Male complaining of bloody stools.
5 y/o F with no significant PMHx presents to the ED c/o fever x 3 days Tmax 102 F. Tylenol was given. Pt was taken to  on Tuesday and tested - for RSV, COVID and flu. PO intake and voiding well. No dysuria, cough, N/V/D. NKDA and Vaccines UTD.

## 2023-10-29 NOTE — ED ADULT NURSE REASSESSMENT NOTE - NS ED NURSE REASSESS COMMENT FT1
received patient  from Shaquille VIGIL. patient in bathroom weak. lowered patient to floor at this time. patient taken into room placed on cardiac monitor LR ordered and given at this time ,. MD Ruiz VS cornelia

## 2023-10-29 NOTE — ED ADULT NURSE NOTE - OBJECTIVE STATEMENT
Pt presents a&ox4 c/o "last sunday at 4 am I woke up with diarrhea, and I went to primary on thursday but they didn't see anything." Denies any blood at that time. He adds, "then this morning at 4 am I noticed blood on bed sheets and it was bright red blood." The pt denies abdominal pain, n/v. He also denies dizziness, headache. He admits to some weakness, and some loss in appetite. PMH dm

## 2023-10-29 NOTE — H&P ADULT - NSHPPHYSICALEXAM_GEN_ALL_CORE
PHYSICAL EXAMINATION:  Vital Signs Last 24 Hrs  T(C): 36.7 (29 Oct 2023 11:52), Max: 36.9 (29 Oct 2023 05:40)  T(F): 98 (29 Oct 2023 11:52), Max: 98.4 (29 Oct 2023 05:40)  HR: 95 (29 Oct 2023 13:55) (92 - 139)  BP: 101/84 (29 Oct 2023 13:55) (101/84 - 126/84)  BP(mean): --  RR: 12 (29 Oct 2023 13:55) (11 - 18)  SpO2: 97% (29 Oct 2023 13:55) (97% - 100%)    Parameters below as of 29 Oct 2023 05:40  Patient On (Oxygen Delivery Method): room air      CAPILLARY BLOOD GLUCOSE      POCT Blood Glucose.: 171 mg/dL (29 Oct 2023 05:54)      GENERAL: NAD,   ENMT: mucous membranes moist  NECK: supple, No JVD  CHEST/LUNG: clear to auscultation bilaterally; no rales, rhonchi, or wheezing b/l  HEART: normal S1, S2  ABDOMEN: BS+, soft, ND, NT   EXTREMITIES:  pulses palpable; no clubbing, cyanosis, or edema b/l LEs  NEURO: awake, alert, interactive; moves all extremities PHYSICAL EXAMINATION:  Vital Signs Last 24 Hrs  T(C): 36.7 (29 Oct 2023 11:52), Max: 36.9 (29 Oct 2023 05:40)  T(F): 98 (29 Oct 2023 11:52), Max: 98.4 (29 Oct 2023 05:40)  HR: 95 (29 Oct 2023 13:55) (92 - 139)  BP: 101/84 (29 Oct 2023 13:55) (101/84 - 126/84)  BP(mean): --  RR: 12 (29 Oct 2023 13:55) (11 - 18)  SpO2: 97% (29 Oct 2023 13:55) (97% - 100%)    Parameters below as of 29 Oct 2023 05:40  Patient On (Oxygen Delivery Method): room air      CAPILLARY BLOOD GLUCOSE      POCT Blood Glucose.: 171 mg/dL (29 Oct 2023 05:54)      GENERAL: NAD, seen in ER, family members at bedside, no abdominal pain, no melena in ER  ENMT: mucous membranes moist  NECK: supple, No JVD  CHEST/LUNG: clear to auscultation bilaterally; no rales, rhonchi, or wheezing b/l  HEART: normal S1, S2  ABDOMEN: BS+, soft, ND, NT   EXTREMITIES:  pulses palpable; no clubbing, cyanosis, or edema b/l LEs  NEURO: awake, alert, interactive; moves all extremities

## 2023-10-29 NOTE — ED ADULT NURSE NOTE - NSFALLRISKINTERV_ED_ALL_ED

## 2023-10-29 NOTE — H&P ADULT - HISTORY OF PRESENT ILLNESS
63 year old male presenting to the ED with 1 week hx of diarrhea assoc with mild abd cramping and generalized weakness.  The patient states that he was seen by his PCP for these symptoms who placed him on Pepto without improvement.  The patient states that he has been trying to hydrate at home without relief. Pt states that came to the ED this morning for evaluation due to rectal bleeding with BM this morning which was bright red.  63 year old male presenting to the ED with 1 week hx of diarrhea assoc with mild abd cramping and generalized weakness.  The patient states that he was seen by his PCP for these symptoms who placed him on Pepto without improvement.  The patient states that he has been trying to hydrate at home without relief. Pt states that came to the ED this morning for evaluation due to rectal bleeding with BM this morning which was bright red. Denies fevers or abdominal pain.     Takes ASA daily as primary prevention.

## 2023-10-29 NOTE — H&P ADULT - NSHPLABSRESULTS_GEN_ALL_CORE
LABS:                        9.9    8.02  )-----------( 221      ( 29 Oct 2023 12:54 )             29.1     10-29    139  |  109<H>  |  19  ----------------------------<  178<H>  4.2   |  22  |  1.28    Ca    8.6      29 Oct 2023 07:30      PT/INR - ( 29 Oct 2023 07:30 )   PT: 11.7 sec;   INR: 0.97 ratio           Urinalysis Basic - ( 29 Oct 2023 07:30 )    Color: x / Appearance: x / SG: x / pH: x  Gluc: 178 mg/dL / Ketone: x  / Bili: x / Urobili: x   Blood: x / Protein: x / Nitrite: x   Leuk Esterase: x / RBC: x / WBC x   Sq Epi: x / Non Sq Epi: x / Bacteria: x          RADIOLOGY & ADDITIONAL TESTS:

## 2023-10-29 NOTE — ED PROVIDER NOTE - OBJECTIVE STATEMENT
63M presenting to the ED with 1 week hx of diarrhea assoc with mild abd cramping and generalized weakness.  The patient states that he was seen by his PCP for these symptoms who placed him on Pepto without improvement.  The patient states that he has been hydrating without n/v.  Pt states that came to the ED this morning for evaluation due to rectal bleeding with BM this morning which was bright red

## 2023-10-29 NOTE — ED PROVIDER NOTE - CLINICAL SUMMARY MEDICAL DECISION MAKING FREE TEXT BOX
63M with diarrhea x 1 week with GI bleed x today assoc with near syncopal episode and tachycardia. Patient with a 3 point drop in HCT during his visit. CT remarkable for diverticulosis.  Patient will require admission to the hospital for evaluation of GI bleeding

## 2023-10-29 NOTE — ED ADULT NURSE NOTE - CHIEF COMPLAINT QUOTE
PMH of DM  C/o one episode of bloody stool 4am today, reports having diarrhea for 1 week, seen pcp last Tuesday, placed on restrictive diet. Reports mild abdominal discomfort,  denies any symptoms. Speaks full complete sentences, unlabored breathing on RA.

## 2023-10-29 NOTE — ED ADULT NURSE REASSESSMENT NOTE - NS ED NURSE REASSESS COMMENT FT1
attempted to clarify orders with MD bharathi Beltre . patient awaiting CT with oral contrast( no actual oral contrast orders. )

## 2023-10-29 NOTE — H&P ADULT - ASSESSMENT
63 year old male presenting to the ED with 1 week hx of diarrhea assoc with mild abd cramping and generalized weakness.  The patient states that he was seen by his PCP for these symptoms who placed him on Pepto without improvement.  The patient states that he has been trying to hydrate at home without relief. Pt states that came to the ED this morning for evaluation due to rectal bleeding with BM this morning which was bright red.     Plan:   63 year old male presenting to the ED with 1 week hx of diarrhea assoc with mild abd cramping and generalized weakness.  The patient states that he was seen by his PCP for these symptoms who placed him on Pepto without improvement.  The patient states that he has been trying to hydrate at home without relief. Pt states that came to the ED this morning for evaluation due to rectal bleeding with BM this morning which was bright red.       Plan:  Admit to medicine for rectal bleeding likely diverticular bleed. HGB acceptable at 9.9, follow in AM. Had colonoscopy one year ago reported as normal. IV Protonix infusion started in ER. NPO for today. Will ask GI Dr. Copeland to follow in AM. INR normal at .97.     DM: Glucose acceptable at 178, NPO, SSC, hold Jardiance and Metformen in hospital.      Only takes 2 DM pills at home and ASA. Will stop ASA, only a primary prevention, no cardiac stents or prior CVA.     63 year old male presenting to the ED with 1 week hx of diarrhea assoc with mild abd cramping and generalized weakness.  The patient states that he was seen by his PCP for these symptoms who placed him on Pepto without improvement.  The patient states that he has been trying to hydrate at home without relief. Pt states that came to the ED this morning for evaluation due to rectal bleeding with BM this morning which was bright red.       Plan:  Admit to medicine for rectal bleeding likely diverticular bleed. HGB acceptable at 9.9, follow in AM. Had colonoscopy one year ago reported as normal. IV Protonix infusion started in ER. NPO for today. Will ask GI Dr. Copeland to follow in AM. INR normal at .97. CT notes sigmoid diverticulosis, requested CT with oral contrast   as requested by radiology. No ABX needed.     DM: Glucose acceptable at 178, NPO, SSC, hold Jardiance and Metformen in hospital.      Only takes 2 DM pills at home and ASA. Will stop ASA, only a primary prevention, no cardiac stents or prior CVA.     63 year old male presenting to the ED with 1 week hx of diarrhea assoc with mild abd cramping and generalized weakness.  The patient states that he was seen by his PCP for these symptoms who placed him on Pepto without improvement.  The patient states that he has been trying to hydrate at home without relief. Pt states that came to the ED this morning for evaluation due to rectal bleeding with BM this morning which was bright red.       Plan:  Admit to medicine for rectal bleeding likely diverticular bleed. HGB acceptable at 9.9, follow in AM. Had colonoscopy one year ago reported as normal. IV Protonix infusion started in ER. NPO for today. Will ask GI Dr. Copeland to follow in AM. INR normal at .97. CT notes sigmoid diverticulosis, requested CT with oral contrast   as requested by radiology. No ABX needed.     DM: Glucose acceptable at 178, NPO, SSC, hold Jardiance and Metformen in hospital.      Only takes 2 DM pills at home and ASA. Will stop ASA, only a primary prevention, no cardiac stents or prior CVA.        Addendum: GI consult on call in [facility] was called this AM for eval for colonoscopy,

## 2023-10-29 NOTE — ED ADULT TRIAGE NOTE - CHIEF COMPLAINT QUOTE
PMH of DM  C/o bloody stool 4am today, reports having diarrhea for 1 week, seen pcp last Tuesday, placed on restrictive diet. Reports mild abdominal discomfort,  denies any symptoms. Speaks full complete sentences, unlabored breathing on RA. PMH of DM  C/o one episode of bloody stool 4am today, reports having diarrhea for 1 week, seen pcp last Tuesday, placed on restrictive diet. Reports mild abdominal discomfort,  denies any symptoms. Speaks full complete sentences, unlabored breathing on RA.

## 2023-10-30 LAB
A1C WITH ESTIMATED AVERAGE GLUCOSE RESULT: 7 % — HIGH (ref 4–5.6)
A1C WITH ESTIMATED AVERAGE GLUCOSE RESULT: 7 % — HIGH (ref 4–5.6)
CHOLEST SERPL-MCNC: 99 MG/DL — SIGNIFICANT CHANGE UP
CHOLEST SERPL-MCNC: 99 MG/DL — SIGNIFICANT CHANGE UP
ESTIMATED AVERAGE GLUCOSE: 154 MG/DL — HIGH (ref 68–114)
ESTIMATED AVERAGE GLUCOSE: 154 MG/DL — HIGH (ref 68–114)
HCT VFR BLD CALC: 24.9 % — LOW (ref 39–50)
HCT VFR BLD CALC: 24.9 % — LOW (ref 39–50)
HCV AB S/CO SERPL IA: 0.11 S/CO — SIGNIFICANT CHANGE UP (ref 0–0.99)
HCV AB S/CO SERPL IA: 0.11 S/CO — SIGNIFICANT CHANGE UP (ref 0–0.99)
HCV AB SERPL-IMP: SIGNIFICANT CHANGE UP
HCV AB SERPL-IMP: SIGNIFICANT CHANGE UP
HDLC SERPL-MCNC: 26 MG/DL — LOW
HDLC SERPL-MCNC: 26 MG/DL — LOW
HGB BLD-MCNC: 8.5 G/DL — LOW (ref 13–17)
HGB BLD-MCNC: 8.5 G/DL — LOW (ref 13–17)
LIPID PNL WITH DIRECT LDL SERPL: 48 MG/DL — SIGNIFICANT CHANGE UP
LIPID PNL WITH DIRECT LDL SERPL: 48 MG/DL — SIGNIFICANT CHANGE UP
MCHC RBC-ENTMCNC: 28.4 PG — SIGNIFICANT CHANGE UP (ref 27–34)
MCHC RBC-ENTMCNC: 28.4 PG — SIGNIFICANT CHANGE UP (ref 27–34)
MCHC RBC-ENTMCNC: 34.1 G/DL — SIGNIFICANT CHANGE UP (ref 32–36)
MCHC RBC-ENTMCNC: 34.1 G/DL — SIGNIFICANT CHANGE UP (ref 32–36)
MCV RBC AUTO: 83.3 FL — SIGNIFICANT CHANGE UP (ref 80–100)
MCV RBC AUTO: 83.3 FL — SIGNIFICANT CHANGE UP (ref 80–100)
NON HDL CHOLESTEROL: 73 MG/DL — SIGNIFICANT CHANGE UP
NON HDL CHOLESTEROL: 73 MG/DL — SIGNIFICANT CHANGE UP
NRBC # BLD: 0 /100 WBCS — SIGNIFICANT CHANGE UP (ref 0–0)
NRBC # BLD: 0 /100 WBCS — SIGNIFICANT CHANGE UP (ref 0–0)
PLATELET # BLD AUTO: 168 K/UL — SIGNIFICANT CHANGE UP (ref 150–400)
PLATELET # BLD AUTO: 168 K/UL — SIGNIFICANT CHANGE UP (ref 150–400)
RBC # BLD: 2.99 M/UL — LOW (ref 4.2–5.8)
RBC # BLD: 2.99 M/UL — LOW (ref 4.2–5.8)
RBC # FLD: 13.9 % — SIGNIFICANT CHANGE UP (ref 10.3–14.5)
RBC # FLD: 13.9 % — SIGNIFICANT CHANGE UP (ref 10.3–14.5)
TRIGL SERPL-MCNC: 148 MG/DL — SIGNIFICANT CHANGE UP
TRIGL SERPL-MCNC: 148 MG/DL — SIGNIFICANT CHANGE UP
WBC # BLD: 5.88 K/UL — SIGNIFICANT CHANGE UP (ref 3.8–10.5)
WBC # BLD: 5.88 K/UL — SIGNIFICANT CHANGE UP (ref 3.8–10.5)
WBC # FLD AUTO: 5.88 K/UL — SIGNIFICANT CHANGE UP (ref 3.8–10.5)
WBC # FLD AUTO: 5.88 K/UL — SIGNIFICANT CHANGE UP (ref 3.8–10.5)

## 2023-10-30 PROCEDURE — 99221 1ST HOSP IP/OBS SF/LOW 40: CPT

## 2023-10-30 PROCEDURE — 99232 SBSQ HOSP IP/OBS MODERATE 35: CPT

## 2023-10-30 RX ORDER — ACETAMINOPHEN 500 MG
325 TABLET ORAL EVERY 4 HOURS
Refills: 0 | Status: DISCONTINUED | OUTPATIENT
Start: 2023-10-30 | End: 2023-10-30

## 2023-10-30 RX ORDER — IRON SUCROSE 20 MG/ML
200 INJECTION, SOLUTION INTRAVENOUS ONCE
Refills: 0 | Status: COMPLETED | OUTPATIENT
Start: 2023-10-30 | End: 2023-10-30

## 2023-10-30 RX ORDER — PANTOPRAZOLE SODIUM 20 MG/1
40 TABLET, DELAYED RELEASE ORAL
Refills: 0 | Status: DISCONTINUED | OUTPATIENT
Start: 2023-10-30 | End: 2023-10-30

## 2023-10-30 RX ORDER — INFLUENZA VIRUS VACCINE 15; 15; 15; 15 UG/.5ML; UG/.5ML; UG/.5ML; UG/.5ML
0.5 SUSPENSION INTRAMUSCULAR ONCE
Refills: 0 | Status: COMPLETED | OUTPATIENT
Start: 2023-10-30 | End: 2023-10-30

## 2023-10-30 RX ORDER — SUCRALFATE 1 G
1 TABLET ORAL
Refills: 0 | Status: DISCONTINUED | OUTPATIENT
Start: 2023-10-30 | End: 2023-10-31

## 2023-10-30 RX ORDER — PANTOPRAZOLE SODIUM 20 MG/1
40 TABLET, DELAYED RELEASE ORAL
Refills: 0 | Status: DISCONTINUED | OUTPATIENT
Start: 2023-10-30 | End: 2023-10-31

## 2023-10-30 RX ADMIN — SODIUM CHLORIDE 125 MILLILITER(S): 9 INJECTION INTRAMUSCULAR; INTRAVENOUS; SUBCUTANEOUS at 00:30

## 2023-10-30 RX ADMIN — Medication 1 GRAM(S): at 23:13

## 2023-10-30 RX ADMIN — PANTOPRAZOLE SODIUM 10 MG/HR: 20 TABLET, DELAYED RELEASE ORAL at 07:57

## 2023-10-30 RX ADMIN — IRON SUCROSE 110 MILLIGRAM(S): 20 INJECTION, SOLUTION INTRAVENOUS at 18:25

## 2023-10-30 NOTE — CONSULT NOTE ADULT - SUBJECTIVE AND OBJECTIVE BOX
63M with DM presenting for evaluation of BRBPR. He reports that starting one week ago, after eating 4-5 day old apple pie, began having watery diarrhea, 6-8 episodes per day. Saw PCP on Thursday, told to hydrate and have bland diet. Diarrhea continued until Sunday, awoke to have bm and had movement of karo blood and found all over bed as well, decided to come to ED. Was tachycardic on arrival. Last bm was yesterday morning, nothing since. He reports some cramping abdominal pain before defecation that improves thereafter. Denies dizziness, lightheadedness.    Last colonoscopy 10/2020 (Dr. Shirley Barajas) - report obtained - good prep, pan-diverticulosis, internal hemorrhoids, 3mm sigmoid polyp removed, lipomatous ICV biopsied.    PMH/PSH--DM    Allergies--  No Known Allergies    Medications--  Other: sodium chloride 0.9%.    Social History--  EtOH: denies   Tobacco: denies   Drug Use: denies     Family/Marital History--noncontributory    Review of Systems:  A >=10-point review of systems was obtained.     Pertinent positives and negatives--  Constitutional: No fevers. No Chills. No Rigors.   Eyes: No visual sx or eye pain  ENMT: No hearing trouble or throat pain  Cardiovascular: No chest pain. No palpitations.  Respiratory: No shortness of breath. No cough.  Gastrointestinal: No nausea or vomiting. +diarrhea. +hematochezia.  Musculoskeletal: No arthralgia or myalgia  Skin: No rashes or lesions  Neurologic: No weakness or disorientation  Psychiatric: Pleasant. Appropriate affect.  Endocrine: No polyuria or polydipsia  Heme/Lymphatic: No easy bruising or immune issues    Review of systems otherwise negative except as previously noted.    Physical Exam--  Vital Signs: T(F): 98.2 (10-30-23 @ 10:40), Max: 98.9 (10-29-23 @ 22:28)  HR: 85 (10-30-23 @ 10:40)  BP: 114/76 (10-30-23 @ 10:40)  RR: 18 (10-30-23 @ 10:40)  SpO2: 97% (10-30-23 @ 10:40)    General: Nontoxic-appearing in no acute distress.  HEENT: AT/NC. Anicteric. Conjunctiva pink and moist.   Neck: Not rigid. No sense of mass.  Nodes: None palpable.  Lungs: Clear bilaterally without rales, wheezing or rhonchi  Heart: Regular rate and rhythm. No Murmur.   Abdomen: Soft. Nondistended. Nontender.   Extremities: No cyanosis or clubbing.   Skin: Warm. Dry. Good turgor. No rash.   Psychiatric: Appropriate affect and mood for situation.     Laboratory & Imaging Data--  CBC                        8.5    5.88  )-----------( 168      ( 30 Oct 2023 06:10 )             24.9       Chemistries  10-29    139  |  109<H>  |  19  ----------------------------<  178<H>  4.2   |  22  |  1.28    Ca    8.6      29 Oct 2023 07:30      ACC: [id number] EXAM:  CT ABDOMEN AND PELVIS IC   ORDERED BY: JERONIMO BALL     PROCEDURE DATE:  10/29/2023          INTERPRETATION:  CLINICAL INFORMATION: Abdominal pain and diarrhea x 1   week, gastrointestinal bleeding today    COMPARISON: None.    CONTRAST/COMPLICATIONS:  IV Contrast: Omnipaque 350  90 cc administered   10 cc discarded  Oral Contrast: NONE  Complications: None reported at time of study completion    PROCEDURE:  CT of the Abdomen and Pelvis was performed.  Sagittal and coronal reformats were performed.    FINDINGS: Evaluation limited by motion artifact.  LOWER CHEST: No gross abnormality.    LIVER: Normal in size. No gross focal lesion..  BILE DUCTS: Normal in caliber.  GALLBLADDER: No intraluminal calcification or pericholecystic fluid.  SPLEEN: Normal in size and configuration.  PANCREAS: Unremarkable.  ADRENALS: No mass  KIDNEYS/URETERS: No renal or ureteral calcification or hydronephrosis.   Bilateral cysts.    BLADDER: Prominently fluid distended but otherwise grossly unremarkable.  REPRODUCTIVE ORGANS: Prostate 4.3 cm in width.    BOWEL: Diverticulosis lower descending colon and sigmoid colon. Apparent   mild diffuse thickening of lower descending colon and upper sigmoid colon   is more prominent short segmental thickening upper sigmoid colon (2-98   and 4-41). No paracolic inflammatory change. No bowel obstruction.   Appendix normal in caliber.  PERITONEUM: No ascites, free air, or abscess.  VESSELS: Normal in caliber.  RETROPERITONEUM/LYMPH NODES: Multiple small mesenteric and   retroperitoneal lymph nodes, none measuring larger than 1 cm in short   axis.  ABDOMINAL WALL: No abnormal mass or fluid collection. Tiny fat-containing   umbilical hernia.  BONES: No acute fracture or focal destructive or blastic lesion.    IMPRESSION: Evaluation limited by motion. Diverticulosis lower descending   colon and sigmoid colon. Apparent mild diffuse thickening of lower   descending colon and upper sigmoid colon without paracolic inflammatory   change. More prominent short segmental thickening upper sigmoid colon   where possibility of mass cannot be excluded. If clinically warranted,   follow-up study with oral contrast administration may be useful for   further evaluation.    ACC: [id number] EXAM:  CT ABDOMEN AND PELVIS OC   ORDERED BY: ADE COLINDRES     PROCEDURE DATE:  10/29/2023          INTERPRETATION:  CLINICAL INFORMATION: 63-year-old man with abdominal   pain and diarrhea for one week. GI bleeding today. Question sigmoid mass   on prior CT    COMPARISON: CT earlier on 10/29/2023    CONTRAST/COMPLICATIONS:  IV Contrast: NONE  Oral Contrast: Positive contrast  Complications: None reported at time of study completion    PROCEDURE:  CT of the Abdomen and Pelvis was performed.  Sagittal and coronal reformats were performed.    FINDINGS:  LOWER CHEST: Small focus impacted airways right lower lobe poorly   visualized on earlier exam due to motion.    LIVER: Within normal limits.  BILE DUCTS: Normal caliber.  GALLBLADDER: Within normal limits.  SPLEEN: Within normal limits.  PANCREAS: Within normal limits.  ADRENALS: Within normal limits.  KIDNEYS/URETERS: Bilateral cysts and subcentimeter hypodensities.    BLADDER: Within normal limits.  REPRODUCTIVE ORGANS: Normal-sized prostate    BOWEL: No bowel obstruction. Sigmoid mural thickening without specific   evidence of mass. Multiple colonic diverticula. Appendix normal.  PERITONEUM: No ascites.  VESSELS: Within normal limits.  RETROPERITONEUM/LYMPH NODES: No lymphadenopathy.  ABDOMINAL WALL: Within normal limits.  BONES: Within normal limits.    IMPRESSION:  No specific evidence of colonic mass. Suggest follow-up colonoscopy      Impression: 63M with likely lower GI bleed, with differential including ischemic colitis given location of colon wall thickening (although significant hgb drop - unless was hemoconcentrated on arrival) vs diverticular bleed. Relatively recent colonoscopy without significant findings. Patient without bm in >24 hours - has likely stopped bleeding.     Recommend:   - resume diet  - monitor for bleeding, trend H/H  - can d/c PPI    Discussed with hospitalist via Teams

## 2023-10-30 NOTE — PROGRESS NOTE ADULT - SUBJECTIVE AND OBJECTIVE BOX
PROGRESS NOTE:     Patient is a 63y old  Male who presents with a chief complaint of BRBPR (30 Oct 2023 12:05)        SUBJECTIVE & OBJECTIVE:   Pt seen and examined at bedside in AM    no overnight events.   no new complaints  last blood BM was yesterday, now states has been having dark BMs but very small amount     REVIEW OF SYSTEMS: remaining ROS negative     PHYSICAL EXAM:  Vital Signs: T(F): 98.2 (10-30-23 @ 10:40), Max: 98.9 (10-29-23 @ 22:28)  HR: 85 (10-30-23 @ 10:40)  BP: 114/76 (10-30-23 @ 10:40)  RR: 18 (10-30-23 @ 10:40)  SpO2: 97% (10-30-23 @ 10:40)      GENERAL: NAD, no increased WOB  HEAD:  Atraumatic, Normocephalic  EYES: EOMI, PERRLA, conjunctiva and sclera clear  ENMT: Moist mucous membranes  NECK: Supple, No JVD  NERVOUS SYSTEM:  Alert & Oriented X3, no focal neuro deficits   CHEST/LUNG: Clear to auscultation bilaterally; No rales, rhonchi, wheezing, or rubs  HEART: Regular rate and rhythm; No murmurs, rubs, or gallops  ABDOMEN: Soft, Nontender, Nondistended; Bowel sounds present  EXTREMITIES:  2+ Peripheral Pulses b/l, No clubbing, cyanosis, calf tenderness or edema b/l    MEDICATIONS  (STANDING):  influenza   Vaccine 0.5 milliLiter(s) IntraMuscular once  pantoprazole    Tablet 40 milliGRAM(s) Oral two times a day  sucralfate 1 Gram(s) Oral four times a day    MEDICATIONS  (PRN):      LABS:                        8.5    5.88  )-----------( 168      ( 30 Oct 2023 06:10 )             24.9                 CAPILLARY BLOOD GLUCOSE                RECENT CULTURES:          RADIOLOGY & ADDITIONAL TESTS:          Radiology reports read and imaging reviewed  :  [ x] YES  [ ] NO  (I am not a radiologist and therefore rely on Radiologist reports to facilitate with diagnosis and treatment plans)    Consultant(s) Notes Reviewed:  [x ] YES  [ ] NO    Care Discussed with Consultants/Other Providers [x ] YES  [ ] NO  Care plan and all findings were discussed in detail with patient and wife at bedside.  All questions and concerns addressed

## 2023-10-30 NOTE — PATIENT PROFILE ADULT - PUBLIC BENEFITS
ER Documentation


Chief Complaint


Date/Time


DATE: 1/14/17 


TIME: 09:32


Chief Complaint


epigastric burning pain radiating to chest and back,NV, diarrhea,





HPI


For [place] female with epigastric pain that radiates to her back, nausea 

vomiting and diarrhea for the last couple days. She denies lightheadedness 

weakness. Denies shortness of breath. Has had no fevers and chills.





ROS


All systems reviewed and are negative except as per history of present illness.





Medications


Home Meds


Active Scripts


Ondansetron (Zofran Odt) 4 Mg Tab.rapdis, 4 MG PO Q6, #10


   Prov:ABBI PANDA DO         1/14/17


Naproxen* (Naproxen*) 500 Mg Tablet, 500 MG PO BID Y for PAIN, #20 TAB


   Prov:ABBI PANDA DO         1/14/17


Ranitidine Hcl* (Zantac*) 150 Mg Tablet, 150 MG PO BID Y for EPIGASTRIC PAIN, #

30 TAB


   Prov:ABBI PANDA DO         1/14/17


Discontinued Reported Medications


Ibuprofen* (Ibuprofen*) 200 Mg Capsule, 200 MG PO QID Y for PAIN, CAP


   7/10/15


Amoxicillin* (Amoxicillin*) 500 Mg Cap, 500 MG PO Q8, CAP


   7/10/15


Discontinued Scripts


Hydrocodone/Acetaminophen (Norco  Tablet) 1 Each Tablet, 1 TAB PO Q6H Y 

for PAIN, #7 TAB


   Prov:LENETOOS,APOSTOLOS A. DO         11/17/16


Ibuprofen* (Motrin*) 800 Mg Tab, 800 MG PO Q6H Y for PAIN AND OR ELEVATED TEMP, 

#30 TAB


   Prov:LEKKOSAPOSTOLOS A. DO         11/17/16


Hydrocodone Bit-Acetaminophen* (Norco*) 5-325 Mg Tab, 1 TAB PO Q6 Y for PAIN, #

10 TAB


   Prov:CLAUDIO JOY         7/10/15





Allergies


Allergies:  


Coded Allergies:  


     No Known Allergy (Unverified , 7/10/15)





PMhx/Soc


Medical and Surgical Hx:  pt denies Medical Hx


History of Surgery:  Yes (tubal ligation)


Anesthesia Reaction:  No


Hx Miscellaneous Medical Probl:  Yes (gallstones)


Hx Alcohol Use:  No


Hx Substance Use:  No


Hx Tobacco Use:  No


Smoking Status:  Never smoker





Physical Exam


Vitals





Vital Signs








  Date Time  Temp Pulse Resp B/P Pulse Ox O2 Delivery O2 Flow Rate FiO2


 


1/14/17 09:16 98.0 78 18 162/70 100   


 


1/14/17 07:15 98.0 80 18 160/74 100   


 


1/14/17 04:51 97.0 78 18 167/78 100   








Physical Exam


Const:  []           No distress


Head:   Atraumatic 


Eyes:    Normal Conjunctiva


ENT:    Normal External Ears, Nose and Mouth.


Neck:               Full range of motion..~ No meningismus.


Resp:    Clear to auscultation bilaterally


Cardio:    Regular rate and rhythm, no murmurs


Abd:    Soft, mild epigastric tenderness without guarding or rebound, non 

distended. Normal bowel sounds


Skin:    No petechiae or rashes


Back:    No midline or flank tenderness


Ext:    No cyanosis, or edema


Neur:    Awake and alert oriented 3, no focal deficits


Psych:    Normal Mood and Affect


Result Diagram:  


1/14/17 0525                                                                   

             1/14/17 0525





Results 24 hrs





 Laboratory Tests








Test


  1/14/17


05:25


 


Alanine Aminotransferase


(ALT/SGPT) 36IU/L 


 


 


Albumin 4.2g/dl 


 


Albumin/Globulin Ratio 1.00 


 


Alkaline Phosphatase 108IU/L 


 


Anion Gap 16 


 


Aspartate Amino Transf


(AST/SGOT) 34IU/L 


 


 


B-Type Natriuretic Peptide 129PG/ML 


 


Basophils # 0.110^3/ul 


 


Basophils % 0.6% 


 


Blood Morphology Comment  


 


Blood Urea Nitrogen 12mg/dl 


 


Calcium Level 8.8mg/dl 


 


Carbon Dioxide Level 22mmol/L 


 


Chloride Level 107mmol/L 


 


Creatinine 0.56mg/dl 


 


Direct Bilirubin 0.00mg/dl 


 


Eosinophils # 0.110^3/ul 


 


Eosinophils % 1.0% 


 


Globulin 4.20g/dl 


 


Glucose Level 112mg/dl 


 


Hematocrit 30.7% 


 


Hemoglobin 9.9g/dl 


 


Indirect Bilirubin 0.2mg/dl 


 


Lipase 106U/L 


 


Lymphocytes # 2.010^3/ul 


 


Lymphocytes % 24.5% 


 


Mean Corpuscular Hemoglobin 26.5pg 


 


Mean Corpuscular Hemoglobin


Concent 32.2g/dl 


 


 


Mean Corpuscular Volume 82.1fl 


 


Mean Platelet Volume 7.5fl 


 


Monocytes # 0.410^3/ul 


 


Monocytes % 4.9% 


 


Neutrophils # 5.510^3/ul 


 


Neutrophils % 69.0% 


 


Nucleated Red Blood Cells # 0.010^3/ul 


 


Nucleated Red Blood Cells % 0.0/100WBC 


 


Platelet Count 45961^3/UL 


 


Potassium Level 4.3mmol/L 


 


Red Blood Count 3.7410^6/ul 


 


Red Cell Distribution Width 17.0% 


 


Sodium Level 141mmol/L 


 


Total Bilirubin 0.2mg/dl 


 


Total Protein 8.4g/dl 


 


Troponin I < 0.012ng/ml 


 


Urine Bilirubin NEGATIVE 


 


Urine Clarity CLEAR 


 


Urine Color LT. YELLOW 


 


Urine Glucose NEGATIVE% 


 


Urine Hemoglobin 3+ 


 


Urine Ketones NEGATIVE 


 


Urine Leukocyte Esterase NEGATIVE 


 


Urine Microscopic RBC 5-10/HPF 


 


Urine Microscopic WBC 0-2/HPF 


 


Urine Nitrite NEGATIVE 


 


Urine Specific Gravity 1.010 


 


Urine Squamous Epithelial


Cells FEW 


 


 


Urine Total Protein NEGATIVE 


 


Urine Urobilinogen 0.2  E.U./dL 


 


Urine pH 6.0 


 


White Blood Count 8.010^3/ul 








 Current Medications








 Medications


  (Trade)  Dose


 Ordered  Sig/Joss


 Route


 PRN Reason  Start Time


 Stop Time Status Last Admin


Dose Admin


 


 Morphine Sulfate


  (morphine)  4 mg  ONCE  STAT


 IV


   1/14/17 05:20


 1/14/17 05:22 DC 1/14/17 05:32


 


 


 Ondansetron HCl


  (Zofran Inj)  4 mg  ONCE  STAT


 IV


   1/14/17 05:20


 1/14/17 05:22 DC 1/14/17 05:31


 


 


 Miscellaneous


 Medication


  (Gi Cocktail (2))  40 ml  ONCE  ONCE


 PO


   1/14/17 06:30


 1/14/17 06:31 DC 1/14/17 07:13


 


 


 Diclofenac Sodium


  (Dyloject)  37.5 mg  ONCE  STAT


 IV


   1/14/17 09:20


 1/14/17 09:25 DC 1/14/17 09:27


 











Procedures/MDM


49-year-old female symptoms of burning chest pain suspicious for GERD. Also 

with right upper quadrant pain which seems to be a separate issue. She does 

have a history of gallstones.  No evidence of acute acute cholecystitis on 

ultrasound or laboratories. Cardiac workup was also performed because of nausea 

vomiting epigastric pain and some lower chest pain. EKG is nonischemic and 

troponin is negative for symptoms that started yesterday. She was given a GI 

cocktail as well as aspirin. GI cocktail resolved the chest pain completely. 

She still had some right upper quadrant pain was given morphine, liter of IV 

fluid, and I'll eject which virtually resolved that pain as well she had been 

given Zofran which completely resolved her nausea. I have low suspicion for 

acute coronary syndrome, dissection, esophageal rupture. I'm discharging with 

Zantac as well as him strict instructions to get a referral from her primary 

care doctor for general surgeon as well as an echocardiogram. Patient is happy 

with this plan is feeling much better.








EKG interpretation: Normal sinus rhythm, normal axis, no ST-T wave changes 

concerning for acute ischemia, QTC of 460, normal EKG


Cardio monitor interpretation: Normal sinus rhythm without arrhythmia


Chest x-ray interpretation: Acute process no pneumothorax no infiltrates no 

pulmonary edema no fractures


Right upper quadrant ultrasound interpretation: Gallstones in gallbladder with 

no evidence of cholecystitis, no presses: Fluid, no ductal that lesion, no wall 

thickening





Departure


Diagnosis:  


 Primary Impression:  


 Biliary colic


 Additional Impressions:  


 Chest pain


 GERD (gastroesophageal reflux disease)


 Anemia


Condition:  Stable


Patient Instructions:  Chest Pain, Uncertain Cause, Gerd (Adult), Biliary Colic 

With Gallstone (Confirmed)





Additional Instructions:  


Llame al doctor KRISTINA y darion yvette EVERETT PARA DENTRO DE 2-3 DEL ROSARIO.  Consigue un 

referral para un GERERAL SURGEON and an ECHOCARIOGRAMA.  Dgale a la secretaria 

que nosotros le instruimos hacer esta everett.Avise o llame si huang condicin se 

empeora antes de la everett. Regresa aqui si peor o no mejor.











ABBI PANDA DO Jan 14, 2017 09:37
no

## 2023-10-30 NOTE — PROGRESS NOTE ADULT - ASSESSMENT
63 year old male with NO PMH presenting to the ED with 1 week hx of diarrhea assoc with mild abd cramping and generalized weakness.  The patient states that he was seen by his PCP for these symptoms who placed him on Pepto without improvement.  The patient states that he has been trying to hydrate at home without relief. Pt states that came to the ED this morning for evaluation due to rectal bleeding with BM this morning which was bright red. of note, patient is on ASA 81mg daily that he takes himself to prevent CVA/MI. No history of either.     Assessment and Plan:   acute blood loss anemia , suspected diverticular bleed  seen by GI, no plan for rescoping, had colonoscopy in 2022 showing diverticulosis   continue with PPI   advanced diet to regular which patient has tolerated   repeat CBC in AM , if stable can be d/c with outpatient GI followup  education of stopping ASA as there is no clear indication to continue taking it , risks outweigh benefit here

## 2023-10-30 NOTE — PATIENT PROFILE ADULT - FALL HARM RISK - HARM RISK INTERVENTIONS

## 2023-10-31 ENCOUNTER — TRANSCRIPTION ENCOUNTER (OUTPATIENT)
Age: 63
End: 2023-10-31

## 2023-10-31 VITALS
HEART RATE: 75 BPM | OXYGEN SATURATION: 98 % | RESPIRATION RATE: 18 BRPM | TEMPERATURE: 98 F | SYSTOLIC BLOOD PRESSURE: 101 MMHG | DIASTOLIC BLOOD PRESSURE: 68 MMHG

## 2023-10-31 LAB
HCT VFR BLD CALC: 23.5 % — LOW (ref 39–50)
HCT VFR BLD CALC: 23.5 % — LOW (ref 39–50)
HCT VFR BLD CALC: 23.7 % — LOW (ref 39–50)
HCT VFR BLD CALC: 23.7 % — LOW (ref 39–50)
HGB BLD-MCNC: 8 G/DL — LOW (ref 13–17)
MCHC RBC-ENTMCNC: 28.2 PG — SIGNIFICANT CHANGE UP (ref 27–34)
MCHC RBC-ENTMCNC: 28.2 PG — SIGNIFICANT CHANGE UP (ref 27–34)
MCHC RBC-ENTMCNC: 28.4 PG — SIGNIFICANT CHANGE UP (ref 27–34)
MCHC RBC-ENTMCNC: 28.4 PG — SIGNIFICANT CHANGE UP (ref 27–34)
MCHC RBC-ENTMCNC: 33.8 G/DL — SIGNIFICANT CHANGE UP (ref 32–36)
MCHC RBC-ENTMCNC: 33.8 G/DL — SIGNIFICANT CHANGE UP (ref 32–36)
MCHC RBC-ENTMCNC: 34 G/DL — SIGNIFICANT CHANGE UP (ref 32–36)
MCHC RBC-ENTMCNC: 34 G/DL — SIGNIFICANT CHANGE UP (ref 32–36)
MCV RBC AUTO: 83.3 FL — SIGNIFICANT CHANGE UP (ref 80–100)
MCV RBC AUTO: 83.3 FL — SIGNIFICANT CHANGE UP (ref 80–100)
MCV RBC AUTO: 83.5 FL — SIGNIFICANT CHANGE UP (ref 80–100)
MCV RBC AUTO: 83.5 FL — SIGNIFICANT CHANGE UP (ref 80–100)
NRBC # BLD: 0 /100 WBCS — SIGNIFICANT CHANGE UP (ref 0–0)
PLATELET # BLD AUTO: 190 K/UL — SIGNIFICANT CHANGE UP (ref 150–400)
PLATELET # BLD AUTO: 190 K/UL — SIGNIFICANT CHANGE UP (ref 150–400)
PLATELET # BLD AUTO: 214 K/UL — SIGNIFICANT CHANGE UP (ref 150–400)
PLATELET # BLD AUTO: 214 K/UL — SIGNIFICANT CHANGE UP (ref 150–400)
RBC # BLD: 2.82 M/UL — LOW (ref 4.2–5.8)
RBC # BLD: 2.82 M/UL — LOW (ref 4.2–5.8)
RBC # BLD: 2.84 M/UL — LOW (ref 4.2–5.8)
RBC # BLD: 2.84 M/UL — LOW (ref 4.2–5.8)
RBC # FLD: 14 % — SIGNIFICANT CHANGE UP (ref 10.3–14.5)
RBC # FLD: 14 % — SIGNIFICANT CHANGE UP (ref 10.3–14.5)
RBC # FLD: 14.2 % — SIGNIFICANT CHANGE UP (ref 10.3–14.5)
RBC # FLD: 14.2 % — SIGNIFICANT CHANGE UP (ref 10.3–14.5)
WBC # BLD: 5.57 K/UL — SIGNIFICANT CHANGE UP (ref 3.8–10.5)
WBC # BLD: 5.57 K/UL — SIGNIFICANT CHANGE UP (ref 3.8–10.5)
WBC # BLD: 5.61 K/UL — SIGNIFICANT CHANGE UP (ref 3.8–10.5)
WBC # BLD: 5.61 K/UL — SIGNIFICANT CHANGE UP (ref 3.8–10.5)
WBC # FLD AUTO: 5.57 K/UL — SIGNIFICANT CHANGE UP (ref 3.8–10.5)
WBC # FLD AUTO: 5.57 K/UL — SIGNIFICANT CHANGE UP (ref 3.8–10.5)
WBC # FLD AUTO: 5.61 K/UL — SIGNIFICANT CHANGE UP (ref 3.8–10.5)
WBC # FLD AUTO: 5.61 K/UL — SIGNIFICANT CHANGE UP (ref 3.8–10.5)

## 2023-10-31 PROCEDURE — 99239 HOSP IP/OBS DSCHRG MGMT >30: CPT

## 2023-10-31 RX ADMIN — Medication 1 GRAM(S): at 05:33

## 2023-10-31 RX ADMIN — PANTOPRAZOLE SODIUM 40 MILLIGRAM(S): 20 TABLET, DELAYED RELEASE ORAL at 17:54

## 2023-10-31 RX ADMIN — Medication 1 GRAM(S): at 17:54

## 2023-10-31 RX ADMIN — Medication 1 GRAM(S): at 11:51

## 2023-10-31 RX ADMIN — PANTOPRAZOLE SODIUM 40 MILLIGRAM(S): 20 TABLET, DELAYED RELEASE ORAL at 05:33

## 2023-10-31 NOTE — DISCHARGE NOTE PROVIDER - NSDCCPCAREPLAN_GEN_ALL_CORE_FT
PRINCIPAL DISCHARGE DIAGNOSIS  Diagnosis: Acute GI bleeding  Assessment and Plan of Treatment: resolved, follow up with gi   follow up with primary care doctor in 3-5 days  if you have red blood again, return to the ED

## 2023-10-31 NOTE — DISCHARGE NOTE PROVIDER - HOSPITAL COURSE
63 year old male with NO PMH presenting to the ED with 1 week hx of diarrhea assoc with mild abd cramping and generalized weakness.  The patient states that he was seen by his PCP for these symptoms who placed him on Pepto without improvement.  The patient states that he has been trying to hydrate at home without relief. Pt states that came to the ED this morning for evaluation due to rectal bleeding with BM this morning which was bright red. of note, patient is on ASA 81mg daily that he takes himself to prevent CVA/MI. No history of either.     Assessment and Plan:   acute blood loss anemia , suspected diverticular bleed  seen by GI, no plan for rescoping, had colonoscopy in 2022 showing diverticulosis      advanced diet to regular which patient has tolerated   cbc stable, had bm this am no blood reported  dc with gi follow up  education of stopping ASA as there is no clear indication to continue taking it , risks outweigh benefit here       pt seen and examined 45 min spent on dc planning     Lab test review, Radiology Review, Vitals review, Consultant review and discussion, Physical examination, IDR, Assessment and plan; Plan discussion with patient and family

## 2023-10-31 NOTE — DISCHARGE NOTE PROVIDER - CARE PROVIDER_API CALL
Lauren Alan  Gastroenterology  [address] [place] [id number]  Phone: [phone]  Fax: [phone]  Follow Up Time:

## 2023-10-31 NOTE — DISCHARGE NOTE NURSING/CASE MANAGEMENT/SOCIAL WORK - PATIENT PORTAL LINK FT
You can access the FollowMyHealth Patient Portal offered by [facility] by registering at the following website: [email or web address]. By joining [employer]’s FollowMyHealth portal, you will also be able to view your health information using other applications (apps) compatible with our system.

## 2023-11-08 DIAGNOSIS — K57.31 DIVERTICULOSIS OF LARGE INTESTINE WITHOUT PERFORATION OR ABSCESS WITH BLEEDING: ICD-10-CM

## 2023-11-08 DIAGNOSIS — R55 SYNCOPE AND COLLAPSE: ICD-10-CM

## 2023-11-08 DIAGNOSIS — R00.0 TACHYCARDIA, UNSPECIFIED: ICD-10-CM

## 2023-11-08 DIAGNOSIS — D62 ACUTE POSTHEMORRHAGIC ANEMIA: ICD-10-CM

## 2024-01-08 RX ORDER — EMPAGLIFLOZIN 10 MG/1
10 TABLET, FILM COATED ORAL DAILY
Qty: 90 | Refills: 1 | Status: ACTIVE | COMMUNITY
Start: 2023-07-11 | End: 1900-01-01

## 2024-01-14 ENCOUNTER — NON-APPOINTMENT (OUTPATIENT)
Age: 64
End: 2024-01-14

## 2024-01-30 ENCOUNTER — APPOINTMENT (OUTPATIENT)
Dept: FAMILY MEDICINE | Facility: CLINIC | Age: 64
End: 2024-01-30
Payer: MEDICAID

## 2024-01-30 VITALS
TEMPERATURE: 98.4 F | HEIGHT: 68 IN | OXYGEN SATURATION: 98 % | BODY MASS INDEX: 31.22 KG/M2 | HEART RATE: 84 BPM | DIASTOLIC BLOOD PRESSURE: 78 MMHG | WEIGHT: 206 LBS | SYSTOLIC BLOOD PRESSURE: 128 MMHG

## 2024-01-30 DIAGNOSIS — Z23 ENCOUNTER FOR IMMUNIZATION: ICD-10-CM

## 2024-01-30 DIAGNOSIS — D50.0 IRON DEFICIENCY ANEMIA SECONDARY TO BLOOD LOSS (CHRONIC): ICD-10-CM

## 2024-01-30 DIAGNOSIS — E11.9 TYPE 2 DIABETES MELLITUS W/OUT COMPLICATIONS: ICD-10-CM

## 2024-01-30 DIAGNOSIS — E78.1 PURE HYPERGLYCERIDEMIA: ICD-10-CM

## 2024-01-30 DIAGNOSIS — L20.9 ATOPIC DERMATITIS, UNSPECIFIED: ICD-10-CM

## 2024-01-30 PROCEDURE — 90686 IIV4 VACC NO PRSV 0.5 ML IM: CPT

## 2024-01-30 PROCEDURE — 99214 OFFICE O/P EST MOD 30 MIN: CPT | Mod: 25

## 2024-01-30 PROCEDURE — G0008: CPT

## 2024-01-30 PROCEDURE — G2211 COMPLEX E/M VISIT ADD ON: CPT

## 2024-01-30 RX ORDER — TRIAMCINOLONE ACETONIDE 1 MG/G
0.1 CREAM TOPICAL TWICE DAILY
Qty: 1 | Refills: 0 | Status: ACTIVE | COMMUNITY
Start: 2024-01-30 | End: 1900-01-01

## 2024-01-30 NOTE — PHYSICAL EXAM
[Normal Sclera/Conjunctiva] : normal sclera/conjunctiva [EOMI] : extraocular movements intact [Coordination Grossly Intact] : coordination grossly intact [Normal Gait] : normal gait [Speech Grossly Normal] : speech grossly normal [Memory Grossly Normal] : memory grossly normal [Alert and Oriented x3] : oriented to person, place, and time [Normal Mood] : the mood was normal [Normal] : affect was normal and insight and judgment were intact [de-identified] : eczematoid skin rash of R 5th finger

## 2024-01-30 NOTE — HISTORY OF PRESENT ILLNESS
[FreeTextEntry1] : follow up management of chronic medical conditions [de-identified] : Pt also is s/p hospitalization in October 2023 at [facility] for acute lower GI bleed and anemia due to blood loss. Likely due to diverticulosis based on CT A/P result done in the hospital. Pt was seen and evaluated by his GI doctor, had colonoscopy done 11/2023, reports no reported abnormal findings, advised to f/u again in 5/2024, and for repeat colonoscopy in 5 yrs. Pt has had no further bleed since then. Sxs initially started with diarrhea.  Pt also seen at the Urgent care on 1/15 for COVID- related illness. He is s/p paxlovid therapy, doing better now, per pt.  His metformin was decreased to 500mg BID by covering provider in October due to improvement in A1C level and GI sxs at the time.

## 2024-02-02 DIAGNOSIS — E78.2 MIXED HYPERLIPIDEMIA: ICD-10-CM

## 2024-02-02 LAB
ANION GAP SERPL CALC-SCNC: 14 MMOL/L
BASOPHILS # BLD AUTO: 0.02 K/UL
BASOPHILS NFR BLD AUTO: 0.4 %
BUN SERPL-MCNC: 8 MG/DL
CALCIUM SERPL-MCNC: 9.5 MG/DL
CHLORIDE SERPL-SCNC: 103 MMOL/L
CHOLEST SERPL-MCNC: 209 MG/DL
CO2 SERPL-SCNC: 20 MMOL/L
CREAT SERPL-MCNC: 1.04 MG/DL
CREAT SPEC-SCNC: 94 MG/DL
EGFR: 81 ML/MIN/1.73M2
EOSINOPHIL # BLD AUTO: 0.06 K/UL
EOSINOPHIL NFR BLD AUTO: 1.3 %
ESTIMATED AVERAGE GLUCOSE: 154 MG/DL
GLUCOSE SERPL-MCNC: 147 MG/DL
HBA1C MFR BLD HPLC: 7 %
HCT VFR BLD CALC: 39.8 %
HDLC SERPL-MCNC: 53 MG/DL
HGB BLD-MCNC: 12.3 G/DL
IMM GRANULOCYTES NFR BLD AUTO: 0.2 %
LDLC SERPL CALC-MCNC: 109 MG/DL
LYMPHOCYTES # BLD AUTO: 1.43 K/UL
LYMPHOCYTES NFR BLD AUTO: 30.2 %
MAN DIFF?: NORMAL
MCHC RBC-ENTMCNC: 24.9 PG
MCHC RBC-ENTMCNC: 30.9 GM/DL
MCV RBC AUTO: 80.6 FL
MICROALBUMIN 24H UR DL<=1MG/L-MCNC: <1.2 MG/DL
MICROALBUMIN/CREAT 24H UR-RTO: NORMAL MG/G
MONOCYTES # BLD AUTO: 0.55 K/UL
MONOCYTES NFR BLD AUTO: 11.6 %
NEUTROPHILS # BLD AUTO: 2.66 K/UL
NEUTROPHILS NFR BLD AUTO: 56.3 %
NONHDLC SERPL-MCNC: 156 MG/DL
PLATELET # BLD AUTO: 289 K/UL
POTASSIUM SERPL-SCNC: 4.4 MMOL/L
RBC # BLD: 4.94 M/UL
RBC # FLD: 16.4 %
SODIUM SERPL-SCNC: 137 MMOL/L
TRIGL SERPL-MCNC: 275 MG/DL
WBC # FLD AUTO: 4.73 K/UL

## 2024-02-02 RX ORDER — ROSUVASTATIN CALCIUM 5 MG/1
5 TABLET, FILM COATED ORAL
Qty: 90 | Refills: 1 | Status: ACTIVE | COMMUNITY
Start: 2024-02-02 | End: 1900-01-01

## 2024-02-02 RX ORDER — METFORMIN HYDROCHLORIDE 500 MG/1
500 TABLET, COATED ORAL TWICE DAILY
Qty: 180 | Refills: 0 | Status: ACTIVE | COMMUNITY
Start: 2022-12-08

## 2024-07-08 ENCOUNTER — RX RENEWAL (OUTPATIENT)
Age: 64
End: 2024-07-08

## 2024-07-22 ENCOUNTER — RX RENEWAL (OUTPATIENT)
Age: 64
End: 2024-07-22

## 2024-11-18 ENCOUNTER — APPOINTMENT (OUTPATIENT)
Dept: FAMILY MEDICINE | Facility: CLINIC | Age: 64
End: 2024-11-18
Payer: MEDICAID

## 2024-11-18 VITALS
SYSTOLIC BLOOD PRESSURE: 150 MMHG | BODY MASS INDEX: 31.22 KG/M2 | WEIGHT: 206 LBS | HEIGHT: 68 IN | OXYGEN SATURATION: 97 % | TEMPERATURE: 98 F | DIASTOLIC BLOOD PRESSURE: 90 MMHG | HEART RATE: 72 BPM

## 2024-11-18 DIAGNOSIS — E78.2 MIXED HYPERLIPIDEMIA: ICD-10-CM

## 2024-11-18 DIAGNOSIS — E11.9 TYPE 2 DIABETES MELLITUS W/OUT COMPLICATIONS: ICD-10-CM

## 2024-11-18 DIAGNOSIS — M25.569 PAIN IN UNSPECIFIED KNEE: ICD-10-CM

## 2024-11-18 DIAGNOSIS — Z00.00 ENCOUNTER FOR GENERAL ADULT MEDICAL EXAMINATION W/OUT ABNORMAL FINDINGS: ICD-10-CM

## 2024-11-18 DIAGNOSIS — R03.0 ELEVATED BLOOD-PRESSURE READING, W/OUT DIAGNOSIS OF HYPERTENSION: ICD-10-CM

## 2024-11-18 PROCEDURE — G0444 DEPRESSION SCREEN ANNUAL: CPT | Mod: 59

## 2024-11-18 PROCEDURE — 99396 PREV VISIT EST AGE 40-64: CPT | Mod: 25

## 2024-11-18 PROCEDURE — G0008: CPT

## 2024-11-18 PROCEDURE — 90656 IIV3 VACC NO PRSV 0.5 ML IM: CPT

## 2024-11-18 RX ORDER — ZOSTER VACCINE RECOMBINANT, ADJUVANTED 50 MCG/0.5
50 KIT INTRAMUSCULAR
Qty: 1 | Refills: 0 | Status: ACTIVE | COMMUNITY
Start: 2024-11-18 | End: 1900-01-01

## 2024-11-18 RX ORDER — IBUPROFEN 400 MG/1
400 TABLET, FILM COATED ORAL
Qty: 14 | Refills: 0 | Status: ACTIVE | COMMUNITY
Start: 2024-11-18 | End: 1900-01-01

## 2024-11-19 LAB
CHOLEST SERPL-MCNC: 196 MG/DL
ESTIMATED AVERAGE GLUCOSE: 203 MG/DL
HBA1C MFR BLD HPLC: 8.7 %
HCT VFR BLD CALC: 48.7 %
HDLC SERPL-MCNC: 61 MG/DL
HGB BLD-MCNC: 15.1 G/DL
LDLC SERPL CALC-MCNC: 99 MG/DL
MCHC RBC-ENTMCNC: 26.4 PG
MCHC RBC-ENTMCNC: 31 G/DL
MCV RBC AUTO: 85 FL
NONHDLC SERPL-MCNC: 134 MG/DL
PLATELET # BLD AUTO: 198 K/UL
RBC # BLD: 5.73 M/UL
RBC # FLD: 19 %
TRIGL SERPL-MCNC: 209 MG/DL
WBC # FLD AUTO: 4.83 K/UL

## 2024-11-19 RX ORDER — METFORMIN HYDROCHLORIDE 1000 MG/1
1000 TABLET, COATED ORAL
Qty: 180 | Refills: 2 | Status: ACTIVE | COMMUNITY
Start: 2024-11-19 | End: 1900-01-01

## 2024-12-05 ENCOUNTER — APPOINTMENT (OUTPATIENT)
Dept: FAMILY MEDICINE | Facility: CLINIC | Age: 64
End: 2024-12-05
Payer: MEDICAID

## 2024-12-05 VITALS
BODY MASS INDEX: 30.92 KG/M2 | WEIGHT: 204 LBS | OXYGEN SATURATION: 98 % | HEART RATE: 91 BPM | HEIGHT: 68 IN | TEMPERATURE: 98.2 F | DIASTOLIC BLOOD PRESSURE: 97 MMHG | SYSTOLIC BLOOD PRESSURE: 136 MMHG

## 2024-12-05 DIAGNOSIS — E11.9 TYPE 2 DIABETES MELLITUS W/OUT COMPLICATIONS: ICD-10-CM

## 2024-12-05 DIAGNOSIS — R03.0 ELEVATED BLOOD-PRESSURE READING, W/OUT DIAGNOSIS OF HYPERTENSION: ICD-10-CM

## 2024-12-05 PROCEDURE — 99213 OFFICE O/P EST LOW 20 MIN: CPT | Mod: 25

## 2025-01-06 ENCOUNTER — APPOINTMENT (OUTPATIENT)
Dept: FAMILY MEDICINE | Facility: CLINIC | Age: 65
End: 2025-01-06
Payer: MEDICAID

## 2025-01-06 VITALS
HEART RATE: 89 BPM | DIASTOLIC BLOOD PRESSURE: 85 MMHG | BODY MASS INDEX: 30.77 KG/M2 | HEIGHT: 68 IN | WEIGHT: 203 LBS | TEMPERATURE: 98.2 F | OXYGEN SATURATION: 96 % | SYSTOLIC BLOOD PRESSURE: 138 MMHG

## 2025-01-06 DIAGNOSIS — I10 ESSENTIAL (PRIMARY) HYPERTENSION: ICD-10-CM

## 2025-01-06 DIAGNOSIS — E11.9 TYPE 2 DIABETES MELLITUS W/OUT COMPLICATIONS: ICD-10-CM

## 2025-01-06 DIAGNOSIS — E78.2 MIXED HYPERLIPIDEMIA: ICD-10-CM

## 2025-01-06 DIAGNOSIS — R03.0 ELEVATED BLOOD-PRESSURE READING, W/OUT DIAGNOSIS OF HYPERTENSION: ICD-10-CM

## 2025-01-06 PROCEDURE — 99214 OFFICE O/P EST MOD 30 MIN: CPT | Mod: 25

## 2025-01-06 RX ORDER — LOSARTAN POTASSIUM 25 MG/1
25 TABLET, FILM COATED ORAL DAILY
Qty: 1 | Refills: 0 | Status: ACTIVE | COMMUNITY
Start: 2025-01-06 | End: 1900-01-01

## 2025-01-07 LAB
ALBUMIN SERPL ELPH-MCNC: 4.7 G/DL
ALP BLD-CCNC: 62 U/L
ALT SERPL-CCNC: 32 U/L
ANION GAP SERPL CALC-SCNC: 14 MMOL/L
AST SERPL-CCNC: 34 U/L
BILIRUB SERPL-MCNC: 0.6 MG/DL
BUN SERPL-MCNC: 13 MG/DL
CALCIUM SERPL-MCNC: 9.6 MG/DL
CHLORIDE SERPL-SCNC: 103 MMOL/L
CO2 SERPL-SCNC: 22 MMOL/L
CREAT SERPL-MCNC: 0.99 MG/DL
EGFR: 85 ML/MIN/1.73M2
ESTIMATED AVERAGE GLUCOSE: 197 MG/DL
GLUCOSE SERPL-MCNC: 151 MG/DL
HBA1C MFR BLD HPLC: 8.5 %
POTASSIUM SERPL-SCNC: 4.4 MMOL/L
PROT SERPL-MCNC: 8 G/DL
SODIUM SERPL-SCNC: 139 MMOL/L
VIT B12 SERPL-MCNC: 808 PG/ML

## 2025-01-07 RX ORDER — METFORMIN HYDROCHLORIDE 500 MG/1
500 TABLET, COATED ORAL DAILY
Qty: 30 | Refills: 0 | Status: ACTIVE | COMMUNITY
Start: 2025-01-07 | End: 1900-01-01

## 2025-01-27 ENCOUNTER — APPOINTMENT (OUTPATIENT)
Dept: FAMILY MEDICINE | Facility: CLINIC | Age: 65
End: 2025-01-27
Payer: MEDICAID

## 2025-01-27 VITALS
DIASTOLIC BLOOD PRESSURE: 80 MMHG | HEIGHT: 68 IN | SYSTOLIC BLOOD PRESSURE: 139 MMHG | BODY MASS INDEX: 30.92 KG/M2 | OXYGEN SATURATION: 97 % | HEART RATE: 99 BPM | WEIGHT: 204 LBS

## 2025-01-27 DIAGNOSIS — E11.9 TYPE 2 DIABETES MELLITUS W/OUT COMPLICATIONS: ICD-10-CM

## 2025-01-27 DIAGNOSIS — I10 ESSENTIAL (PRIMARY) HYPERTENSION: ICD-10-CM

## 2025-01-27 DIAGNOSIS — R19.5 OTHER FECAL ABNORMALITIES: ICD-10-CM

## 2025-01-27 DIAGNOSIS — E78.2 MIXED HYPERLIPIDEMIA: ICD-10-CM

## 2025-01-27 PROCEDURE — 99214 OFFICE O/P EST MOD 30 MIN: CPT | Mod: 25

## 2025-01-28 LAB
ALBUMIN SERPL ELPH-MCNC: 4.5 G/DL
ALP BLD-CCNC: 63 U/L
ALT SERPL-CCNC: 24 U/L
ANION GAP SERPL CALC-SCNC: 14 MMOL/L
ANION GAP SERPL CALC-SCNC: 15 MMOL/L
AST SERPL-CCNC: 24 U/L
BILIRUB SERPL-MCNC: 0.3 MG/DL
BUN SERPL-MCNC: 15 MG/DL
BUN SERPL-MCNC: 16 MG/DL
CALCIUM SERPL-MCNC: 9.3 MG/DL
CALCIUM SERPL-MCNC: 9.5 MG/DL
CHLORIDE SERPL-SCNC: 104 MMOL/L
CHLORIDE SERPL-SCNC: 104 MMOL/L
CO2 SERPL-SCNC: 21 MMOL/L
CO2 SERPL-SCNC: 21 MMOL/L
CREAT SERPL-MCNC: 0.92 MG/DL
CREAT SERPL-MCNC: 0.95 MG/DL
CREAT SPEC-SCNC: 76 MG/DL
EGFR: 89 ML/MIN/1.73M2
EGFR: 93 ML/MIN/1.73M2
ESTIMATED AVERAGE GLUCOSE: 189 MG/DL
FERRITIN SERPL-MCNC: 37 NG/ML
GLUCOSE SERPL-MCNC: 164 MG/DL
GLUCOSE SERPL-MCNC: 166 MG/DL
HBA1C MFR BLD HPLC: 8.2 %
HCT VFR BLD CALC: 43.7 %
HGB BLD-MCNC: 14.6 G/DL
IRON SATN MFR SERPL: 24 %
IRON SERPL-MCNC: 89 UG/DL
MCHC RBC-ENTMCNC: 28.7 PG
MCHC RBC-ENTMCNC: 33.4 G/DL
MCV RBC AUTO: 85.9 FL
MICROALBUMIN 24H UR DL<=1MG/L-MCNC: <1.2 MG/DL
MICROALBUMIN/CREAT 24H UR-RTO: NORMAL MG/G
PLATELET # BLD AUTO: 237 K/UL
POTASSIUM SERPL-SCNC: 4.4 MMOL/L
POTASSIUM SERPL-SCNC: 4.5 MMOL/L
PROT SERPL-MCNC: 7.9 G/DL
RBC # BLD: 5.09 M/UL
RBC # FLD: 16.2 %
SODIUM SERPL-SCNC: 139 MMOL/L
SODIUM SERPL-SCNC: 139 MMOL/L
TIBC SERPL-MCNC: 365 UG/DL
UIBC SERPL-MCNC: 276 UG/DL
WBC # FLD AUTO: 5.07 K/UL

## 2025-02-03 ENCOUNTER — RX RENEWAL (OUTPATIENT)
Age: 65
End: 2025-02-03

## 2025-04-25 ENCOUNTER — TRANSCRIPTION ENCOUNTER (OUTPATIENT)
Age: 65
End: 2025-04-25

## 2025-07-16 ENCOUNTER — APPOINTMENT (OUTPATIENT)
Dept: INTERNAL MEDICINE | Facility: CLINIC | Age: 65
End: 2025-07-16

## 2025-07-21 ENCOUNTER — TRANSCRIPTION ENCOUNTER (OUTPATIENT)
Age: 65
End: 2025-07-21

## 2025-08-18 ENCOUNTER — RX RENEWAL (OUTPATIENT)
Age: 65
End: 2025-08-18